# Patient Record
Sex: MALE | Race: BLACK OR AFRICAN AMERICAN | Employment: UNEMPLOYED | URBAN - METROPOLITAN AREA
[De-identification: names, ages, dates, MRNs, and addresses within clinical notes are randomized per-mention and may not be internally consistent; named-entity substitution may affect disease eponyms.]

---

## 2021-04-21 ENCOUNTER — OFFICE VISIT (OUTPATIENT)
Dept: CARDIOLOGY CLINIC | Age: 61
End: 2021-04-21
Payer: MEDICAID

## 2021-04-21 VITALS
WEIGHT: 229.9 LBS | DIASTOLIC BLOOD PRESSURE: 88 MMHG | BODY MASS INDEX: 32.19 KG/M2 | RESPIRATION RATE: 18 BRPM | SYSTOLIC BLOOD PRESSURE: 150 MMHG | OXYGEN SATURATION: 99 % | HEART RATE: 98 BPM | HEIGHT: 71 IN

## 2021-04-21 DIAGNOSIS — I10 ESSENTIAL HYPERTENSION: ICD-10-CM

## 2021-04-21 DIAGNOSIS — Z95.5 S/P DRUG ELUTING CORONARY STENT PLACEMENT: ICD-10-CM

## 2021-04-21 DIAGNOSIS — I48.0 PAF (PAROXYSMAL ATRIAL FIBRILLATION) (HCC): ICD-10-CM

## 2021-04-21 DIAGNOSIS — I25.10 ASHD (ARTERIOSCLEROTIC HEART DISEASE): Primary | ICD-10-CM

## 2021-04-21 DIAGNOSIS — F17.200 CURRENT SMOKER: ICD-10-CM

## 2021-04-21 DIAGNOSIS — I42.0 DILATED CARDIOMYOPATHY (HCC): ICD-10-CM

## 2021-04-21 PROCEDURE — 99204 OFFICE O/P NEW MOD 45 MIN: CPT | Performed by: INTERNAL MEDICINE

## 2021-04-21 PROCEDURE — 93000 ELECTROCARDIOGRAM COMPLETE: CPT | Performed by: INTERNAL MEDICINE

## 2021-04-21 RX ORDER — CARVEDILOL 6.25 MG/1
6.25 TABLET ORAL 2 TIMES DAILY WITH MEALS
COMMUNITY
End: 2021-04-21

## 2021-04-21 RX ORDER — CLOPIDOGREL BISULFATE 75 MG/1
75 TABLET ORAL DAILY
COMMUNITY
End: 2021-04-21

## 2021-04-21 RX ORDER — CARVEDILOL 12.5 MG/1
12.5 TABLET ORAL 2 TIMES DAILY WITH MEALS
Qty: 180 TAB | Refills: 1 | Status: SHIPPED | OUTPATIENT
Start: 2021-04-21 | End: 2021-12-30

## 2021-04-21 NOTE — PROGRESS NOTES
[address] [facility], [address] [facility]  [phone]     Subjective: Slick Lara is a 56 y.o. male is here for  New patient consultation. He has pmhx CAD s/p stent, Dilated Cardiomyopathy, HTN, HLD, current smoker, and esophageal  Spasms. He drinks on weekends only, denies any illegal drug use. Father  from MI age early 46s. Endorses sob at night laying supine, and with activity. Gradually progressing. Mild chest heaviness at times. Also reports fatigue. He presents in AF, unknown duration. Denies hx irregular heart beat. The patient denies orthopnea, PND, LE edema, palpitations, syncope, or presyncope. There are no active problems to display for this patient. None  Past Medical History:   Diagnosis Date    Cardiomyopathy [facility])     Essential hypertension       Past Surgical History:   Procedure Laterality Date    HX CORONARY STENT PLACEMENT       No Known Allergies   History reviewed. No pertinent family history.    Social History     Socioeconomic History    Marital status: UNKNOWN     Spouse name: Not on file    Number of children: Not on file    Years of education: Not on file    Highest education level: Not on file   Occupational History    Not on file   Social Needs    Financial resource strain: Not on file    Food insecurity     Worry: Not on file     Inability: Not on file    Transportation needs     Medical: Not on file     Non-medical: Not on file   Tobacco Use    Smoking status: Light Tobacco Smoker    Smokeless tobacco: Never Used   Substance and Sexual Activity    Alcohol use: Yes     Comment: Socially    Drug use: Never    Sexual activity: Yes     Partners: Female   Lifestyle    Physical activity     Days per week: Not on file     Minutes per session: Not on file    Stress: Not on file   Relationships    Social connections     Talks on phone: Not on file     Gets together: Not on file     Attends Yazidi service: Not on file     Active member of club or organization: Not on file     Attends meetings of clubs or organizations: Not on file     Relationship status: Not on file    Intimate partner violence     Fear of current or ex partner: Not on file     Emotionally abused: Not on file     Physically abused: Not on file     Forced sexual activity: Not on file   Other Topics Concern    Not on file   Social History Narrative    Not on file      Current Outpatient Medications   Medication Sig    ASPIRIN PO Take  by mouth.  apixaban (ELIQUIS) 5 mg tablet Take 1 Tab by mouth two (2) times a day.  carvediloL (Coreg) 12.5 mg tablet Take 1 Tab by mouth two (2) times daily (with meals). No current facility-administered medications for this visit. Review of Symptoms:  11 systems reviewed, negative other than as stated in the HPI    Physical ExamPhysical Exam:    Vitals:    04/21/21 1452   BP: (!) 150/88   Pulse: 98   Resp: 18   SpO2: 99%   Weight: 229 lb 14.4 oz (104.3 kg)   Height: 5' 11\" (1.803 m)     Body mass index is 32.06 kg/m². General PE  Gen:  NAD  Mental Status - Alert. General Appearance - Not in acute distress. HEENT:  PERRL, no carotid bruits or JVD  Chest and Lung Exam   Inspection: Accessory muscles - No use of accessory muscles in breathing. Auscultation:   Breath sounds: - Normal.   Cardiovascular   Inspection: Jugular vein - Bilateral - Inspection Normal.   Palpation/Percussion:   Apical Impulse: - Normal.   Auscultation: Rhythm - IRRegular. Heart Sounds - S1 WNL and S2 WNL. No S3 or S4. Murmurs & Other Heart Sounds: Auscultation of the heart reveals - No Murmurs. Peripheral Vascular   Upper Extremity: Inspection - Bilateral - No Cyanotic nailbeds or Digital clubbing. Lower Extremity:   Palpation: Edema - Bilateral - No edema. Abdomen:   Soft, non-tender, bowel sounds are active.   Neuro: A&O times 3, CN and motor grossly WNL    Labs:   No results found for: CHOL, CHOLX, CHLST, CHOLV, [id number], HDL, HDLP, LDL, LDLC, DLDLP, TGLX, TRIGL, TRIGP, CHHD, CHHDX  No results found for: CPK, CPKX, CPX  No results found for: NA, K, CL, CO2, AGAP, GLU, BUN, CREA, BUCR, GFRAA, GFRNA, CA, TBIL, TBILI, AP, TP, ALB, GLOB, AGRAT, ALT    EKG:  AF       Assessment:     Assessment:      1. ASHD (arteriosclerotic heart disease)    2. Dilated cardiomyopathy ([facility]    3. [facility]    4. Essential hypertension    5. S/P drug eluting coronary stent placement    6. Current smoker        Orders Placed This Encounter    LIPID PANEL     Standing Status:   Future     Standing Expiration Date:   9/55/3162    METABOLIC PANEL, COMPREHENSIVE     Standing Status:   Future     Standing Expiration Date:   4/21/2022    CBC W/O DIFF     Standing Status:   Future     Standing Expiration Date:   4/21/2022    MAGNESIUM     Standing Status:   Future     Standing Expiration Date:   4/21/2022    TSH 3RD GENERATION     Standing Status:   Future     Standing Expiration Date:   4/21/2022    T4 (THYROXINE)     Standing Status:   Future     Standing Expiration Date:   4/21/2022    AMB POC EKG ROUTINE W/ 12 LEADS, INTER & REP     Order Specific Question:   Reason for Exam:     Answer:   Routine    ASPIRIN PO     Sig: Take  by mouth.  DISCONTD: clopidogreL (Plavix) 75 mg tab     Sig: Take 75 mg by mouth daily.  DISCONTD: carvediloL (Coreg) 6.25 mg tablet     Sig: Take 6.25 mg by mouth two (2) times daily (with meals).  apixaban (ELIQUIS) 5 mg tablet     Sig: Take 1 Tab by mouth two (2) times a day. Dispense:  180 Tab     Refill:  1    carvediloL (Coreg) 12.5 mg tablet     Sig: Take 1 Tab by mouth two (2) times daily (with meals).      Dispense:  180 Tab     Refill:  1        Plan:     Atrial Fibrillation  Unknown duration  Check echo  Increase Carvedilol 12.5 mg BID for rate control  Atrial Fibrillation CHADSVASC2 Score Stroke Risk:   56 y.o. <65        + 0    male Male     [de-identified]   CHF HX: Yes    +1   HTN HX: Yes    +1   Stroke/TIA/Thromboembolism No    +0   Vascular Disease HX: Yes    +1   Diabetes Mellitus No    + 0   CHADSVASC 2 Score 3      Annual Stroke Risk 3.2% - moderate-high        Start eliquis 5 mg BID  Discussed risks and benefits of anticoagulation, signs and symptoms of bleeding, and to call if any concerns. Follow up in 4 weeks. Consider setting up cardioversion plus or minus NBA at that time. CAD   Hx PCI/TOMASZ to RCA in 12/18  On ASA, increasing BB  Checking stress test cardiolyte for c/o watkins  Checking labs and will start statin then       Dilated  Cardiomyopathy  On BB  Obtain echo. If EF remains low and stable kidney fxn, will start entresto  Recall: prev on losartan    HLD  Check labs now. Will start statin once labs are back  Recall: prev on atorva    Current smoker; down to less than half a pack per day  Tobacco cessation provided      Follow-up to be determined based on test results. Bart Gibson NP    Patient seen and examined by me with the above nurse practitioner. I personally performed all components of the history, physical, and medical decision making and agree with the assessment and plan with minor modifications as noted. Today the patient presents with new onset of AF, and overdue for follow-up with history of CAD and suspected hypertensive cardiomyopathy, ejection fraction 30 to 35%. General PE  Gen:  NAD  Mental Status - Alert. General Appearance - Not in acute distress. HEENT:  PERRL, no carotid bruits or JVD  Chest and Lung Exam   Inspection: Accessory muscles - No use of accessory muscles in breathing. Auscultation:   Breath sounds: - Normal.   Cardiovascular   Inspection: Jugular vein - Bilateral - Inspection Normal.   Palpation/Percussion:   Apical Impulse: - Normal.   Auscultation: Rhythm -irregular, slightly tachycardic. Heart Sounds - S1 WNL and S2 WNL. No S3 or S4. Murmurs & Other Heart Sounds:  Auscultation of the heart reveals - No Murmurs. Peripheral Vascular   Upper Extremity: Inspection - Bilateral - No Cyanotic nailbeds or Digital clubbing. Lower Extremity:   Palpation: Edema - Bilateral - No edema. Abdomen:   Soft, non-tender, bowel sounds are active. Neuro: A&O times 3, CN and motor grossly WNL    New onset AF. Start Eliquis. Get echo and walking stress Myoview for HILL. Check labs. Reassess patient in 1 month and consider cardioversion plus or minus NBA at that time. Discussed risks and benefits of anticoagulation, signs and symptoms of bleeding, and to call if any concerns. He does admit he drinks alcohol on weekends and I advised on the relationship between AF and alcohol.

## 2021-04-21 NOTE — PROGRESS NOTES
1. Have you been to the ER, urgent care clinic since your last visit? Hospitalized since your last visit? No    2. Have you seen or consulted any other health care providers outside of the [address] [place] [address] since your last visit? Include any pap smears or colon screening.  No

## 2021-05-07 ENCOUNTER — APPOINTMENT (OUTPATIENT)
Dept: GENERAL RADIOLOGY | Age: 61
DRG: 192 | End: 2021-05-07
Attending: EMERGENCY MEDICINE
Payer: MEDICAID

## 2021-05-07 ENCOUNTER — HOSPITAL ENCOUNTER (INPATIENT)
Age: 61
LOS: 4 days | Discharge: HOME | DRG: 192 | End: 2021-05-11
Attending: EMERGENCY MEDICINE | Admitting: HOSPITALIST
Payer: MEDICAID

## 2021-05-07 DIAGNOSIS — R07.9 CHEST PAIN, UNSPECIFIED TYPE: ICD-10-CM

## 2021-05-07 DIAGNOSIS — I16.1 HYPERTENSIVE EMERGENCY: Primary | ICD-10-CM

## 2021-05-07 DIAGNOSIS — I50.9 CONGESTIVE HEART FAILURE, UNSPECIFIED HF CHRONICITY, UNSPECIFIED HEART FAILURE TYPE (HCC): ICD-10-CM

## 2021-05-07 DIAGNOSIS — Z86.79 HISTORY OF ATRIAL FIBRILLATION: ICD-10-CM

## 2021-05-07 DIAGNOSIS — R77.8 ELEVATED TROPONIN: ICD-10-CM

## 2021-05-07 PROBLEM — I10 HTN (HYPERTENSION), MALIGNANT: Status: ACTIVE | Noted: 2021-05-07

## 2021-05-07 LAB
ALBUMIN SERPL-MCNC: 3.7 G/DL (ref 3.5–5)
ALBUMIN/GLOB SERPL: 0.9 {RATIO} (ref 1.1–2.2)
ALP SERPL-CCNC: 108 U/L (ref 45–117)
ALT SERPL-CCNC: 58 U/L (ref 12–78)
ANION GAP SERPL CALC-SCNC: 8 MMOL/L (ref 5–15)
AST SERPL W P-5'-P-CCNC: 68 U/L (ref 15–37)
ATRIAL RATE: 101 BPM
BASOPHILS # BLD: 0.1 K/UL (ref 0–0.1)
BASOPHILS NFR BLD: 1 % (ref 0–1)
BILIRUB SERPL-MCNC: 0.8 MG/DL (ref 0.2–1)
BNP SERPL-MCNC: 2197 PG/ML
BUN SERPL-MCNC: 11 MG/DL (ref 6–20)
BUN/CREAT SERPL: 11 (ref 12–20)
CA-I BLD-MCNC: 9 MG/DL (ref 8.5–10.1)
CALCULATED P AXIS, ECG09: 84 DEGREES
CALCULATED R AXIS, ECG10: 35 DEGREES
CALCULATED T AXIS, ECG11: 109 DEGREES
CHLORIDE SERPL-SCNC: 104 MMOL/L (ref 97–108)
CO2 SERPL-SCNC: 26 MMOL/L (ref 21–32)
CREAT SERPL-MCNC: 0.96 MG/DL (ref 0.7–1.3)
DIAGNOSIS, 93000: NORMAL
DIFFERENTIAL METHOD BLD: ABNORMAL
EOSINOPHIL # BLD: 0.1 K/UL (ref 0–0.4)
EOSINOPHIL NFR BLD: 1 % (ref 0–7)
ERYTHROCYTE [DISTWIDTH] IN BLOOD BY AUTOMATED COUNT: 18.5 % (ref 11.5–14.5)
GLOBULIN SER CALC-MCNC: 4.2 G/DL (ref 2–4)
GLUCOSE SERPL-MCNC: 107 MG/DL (ref 65–100)
HCT VFR BLD AUTO: 49.3 % (ref 36.6–50.3)
HGB BLD-MCNC: 15.7 G/DL (ref 12.1–17)
IMM GRANULOCYTES # BLD AUTO: 0 K/UL (ref 0–0.04)
IMM GRANULOCYTES NFR BLD AUTO: 0 % (ref 0–0.5)
INR PPP: 1.1 (ref 0.9–1.1)
LYMPHOCYTES # BLD: 2.3 K/UL (ref 0.8–3.5)
LYMPHOCYTES NFR BLD: 25 % (ref 12–49)
MAGNESIUM SERPL-MCNC: 1.8 MG/DL (ref 1.6–2.4)
MCH RBC QN AUTO: 23.2 PG (ref 26–34)
MCHC RBC AUTO-ENTMCNC: 31.8 G/DL (ref 30–36.5)
MCV RBC AUTO: 72.9 FL (ref 80–99)
MONOCYTES # BLD: 0.7 K/UL (ref 0–1)
MONOCYTES NFR BLD: 7 % (ref 5–13)
NEUTS SEG # BLD: 6.2 K/UL (ref 1.8–8)
NEUTS SEG NFR BLD: 66 % (ref 32–75)
NRBC # BLD: 0 K/UL (ref 0–0.01)
NRBC BLD-RTO: 0 PER 100 WBC
P-R INTERVAL, ECG05: 214 MS
PLATELET # BLD AUTO: 340 K/UL (ref 150–400)
PMV BLD AUTO: 11.8 FL (ref 8.9–12.9)
POTASSIUM SERPL-SCNC: 3.5 MMOL/L (ref 3.5–5.1)
PROT SERPL-MCNC: 7.9 G/DL (ref 6.4–8.2)
PROTHROMBIN TIME: 13.9 SEC (ref 11.9–14.7)
Q-T INTERVAL, ECG07: 324 MS
QRS DURATION, ECG06: 86 MS
QTC CALCULATION (BEZET), ECG08: 420 MS
RBC # BLD AUTO: 6.76 M/UL (ref 4.1–5.7)
SODIUM SERPL-SCNC: 138 MMOL/L (ref 136–145)
TROPONIN I SERPL-MCNC: 0.1 NG/ML
TROPONIN I SERPL-MCNC: 0.11 NG/ML
TROPONIN I SERPL-MCNC: 0.12 NG/ML
VENTRICULAR RATE, ECG03: 101 BPM
WBC # BLD AUTO: 9.4 K/UL (ref 4.1–11.1)

## 2021-05-07 PROCEDURE — 74011000250 HC RX REV CODE- 250: Performed by: EMERGENCY MEDICINE

## 2021-05-07 PROCEDURE — 74011250637 HC RX REV CODE- 250/637: Performed by: EMERGENCY MEDICINE

## 2021-05-07 PROCEDURE — 36415 COLL VENOUS BLD VENIPUNCTURE: CPT

## 2021-05-07 PROCEDURE — 74011250636 HC RX REV CODE- 250/636: Performed by: EMERGENCY MEDICINE

## 2021-05-07 PROCEDURE — 85610 PROTHROMBIN TIME: CPT

## 2021-05-07 PROCEDURE — 84484 ASSAY OF TROPONIN QUANT: CPT

## 2021-05-07 PROCEDURE — 71045 X-RAY EXAM CHEST 1 VIEW: CPT

## 2021-05-07 PROCEDURE — 99284 EMERGENCY DEPT VISIT MOD MDM: CPT

## 2021-05-07 PROCEDURE — 80053 COMPREHEN METABOLIC PANEL: CPT

## 2021-05-07 PROCEDURE — 83735 ASSAY OF MAGNESIUM: CPT

## 2021-05-07 PROCEDURE — 85025 COMPLETE CBC W/AUTO DIFF WBC: CPT

## 2021-05-07 PROCEDURE — 96374 THER/PROPH/DIAG INJ IV PUSH: CPT

## 2021-05-07 PROCEDURE — 65270000029 HC RM PRIVATE

## 2021-05-07 PROCEDURE — 74011250637 HC RX REV CODE- 250/637: Performed by: HOSPITALIST

## 2021-05-07 PROCEDURE — 96375 TX/PRO/DX INJ NEW DRUG ADDON: CPT

## 2021-05-07 PROCEDURE — 83880 ASSAY OF NATRIURETIC PEPTIDE: CPT

## 2021-05-07 PROCEDURE — 93005 ELECTROCARDIOGRAM TRACING: CPT

## 2021-05-07 PROCEDURE — 74011250636 HC RX REV CODE- 250/636: Performed by: INTERNAL MEDICINE

## 2021-05-07 RX ORDER — LABETALOL HYDROCHLORIDE 5 MG/ML
20 INJECTION, SOLUTION INTRAVENOUS ONCE
Status: COMPLETED | OUTPATIENT
Start: 2021-05-07 | End: 2021-05-07

## 2021-05-07 RX ORDER — FUROSEMIDE 10 MG/ML
80 INJECTION INTRAMUSCULAR; INTRAVENOUS ONCE
Status: COMPLETED | OUTPATIENT
Start: 2021-05-07 | End: 2021-05-07

## 2021-05-07 RX ORDER — GUAIFENESIN 100 MG/5ML
81 LIQUID (ML) ORAL DAILY
Status: DISCONTINUED | OUTPATIENT
Start: 2021-05-08 | End: 2021-05-07

## 2021-05-07 RX ORDER — LISINOPRIL 5 MG/1
10 TABLET ORAL DAILY
Status: DISCONTINUED | OUTPATIENT
Start: 2021-05-07 | End: 2021-05-11 | Stop reason: HOSPADM

## 2021-05-07 RX ORDER — FUROSEMIDE 10 MG/ML
40 INJECTION INTRAMUSCULAR; INTRAVENOUS DAILY
Status: DISCONTINUED | OUTPATIENT
Start: 2021-05-07 | End: 2021-05-11

## 2021-05-07 RX ORDER — DILTIAZEM HCL/D5W 125 MG/125
5 PLASTIC BAG, INJECTION (ML) INTRAVENOUS
Status: DISCONTINUED | OUTPATIENT
Start: 2021-05-07 | End: 2021-05-08

## 2021-05-07 RX ORDER — HEPARIN SODIUM 1000 [USP'U]/ML
2000 INJECTION, SOLUTION INTRAVENOUS; SUBCUTANEOUS AS NEEDED
Status: DISCONTINUED | OUTPATIENT
Start: 2021-05-07 | End: 2021-05-11 | Stop reason: HOSPADM

## 2021-05-07 RX ORDER — LABETALOL HCL 20 MG/4 ML
10 SYRINGE (ML) INTRAVENOUS
Status: DISCONTINUED | OUTPATIENT
Start: 2021-05-07 | End: 2021-05-11 | Stop reason: HOSPADM

## 2021-05-07 RX ORDER — HEPARIN SODIUM 1000 [USP'U]/ML
4000 INJECTION, SOLUTION INTRAVENOUS; SUBCUTANEOUS AS NEEDED
Status: DISCONTINUED | OUTPATIENT
Start: 2021-05-07 | End: 2021-05-11 | Stop reason: HOSPADM

## 2021-05-07 RX ORDER — ASPIRIN 325 MG
325 TABLET ORAL
Status: COMPLETED | OUTPATIENT
Start: 2021-05-07 | End: 2021-05-07

## 2021-05-07 RX ORDER — HEPARIN SODIUM 10000 [USP'U]/100ML
12-25 INJECTION, SOLUTION INTRAVENOUS CONTINUOUS
Status: DISCONTINUED | OUTPATIENT
Start: 2021-05-07 | End: 2021-05-07

## 2021-05-07 RX ORDER — DILTIAZEM HYDROCHLORIDE 5 MG/ML
20 INJECTION INTRAVENOUS
Status: DISPENSED | OUTPATIENT
Start: 2021-05-07 | End: 2021-05-07

## 2021-05-07 RX ORDER — CARVEDILOL 12.5 MG/1
12.5 TABLET ORAL 2 TIMES DAILY WITH MEALS
Status: DISCONTINUED | OUTPATIENT
Start: 2021-05-07 | End: 2021-05-11 | Stop reason: HOSPADM

## 2021-05-07 RX ORDER — HEPARIN SODIUM 10000 [USP'U]/100ML
12-25 INJECTION, SOLUTION INTRAVENOUS CONTINUOUS
Status: DISCONTINUED | OUTPATIENT
Start: 2021-05-07 | End: 2021-05-10

## 2021-05-07 RX ADMIN — Medication 5 MG/HR: at 18:52

## 2021-05-07 RX ADMIN — CARVEDILOL 12.5 MG: 12.5 TABLET, FILM COATED ORAL at 08:29

## 2021-05-07 RX ADMIN — FUROSEMIDE 80 MG: 10 INJECTION, SOLUTION INTRAMUSCULAR; INTRAVENOUS at 07:22

## 2021-05-07 RX ADMIN — APIXABAN 5 MG: 5 TABLET, FILM COATED ORAL at 08:30

## 2021-05-07 RX ADMIN — LABETALOL HYDROCHLORIDE 20 MG: 5 INJECTION INTRAVENOUS at 07:23

## 2021-05-07 RX ADMIN — LISINOPRIL 10 MG: 10 TABLET ORAL at 10:45

## 2021-05-07 RX ADMIN — NITROGLYCERIN 1 INCH: 20 OINTMENT TOPICAL at 07:24

## 2021-05-07 RX ADMIN — ASPIRIN 325 MG ORAL TABLET 325 MG: 325 PILL ORAL at 08:29

## 2021-05-07 NOTE — CONSULTS
Cardiology Consult    NAME: Uli Alcantara   :  1960   MRN:  [id number]     Date/Time:  2021 3:12 PM    Patient PCP: Maria A Menendez NP      Subjective:   CHIEF COMPLAINT: SOB      REASON FOR CONSULT: Atrial fibrillation      HISTORY OF PRESENT ILLNESS:     Uli Alcantara is a 56 y.o. BLACK/ male who has a HTN, CAD, s/p previous MI with PCI in 2018 at [facility]. He has persistent atrial fibrillation and has been started on Eliquis and has a follow-up appointment with his cardiologist in the next couple of weeks. He came to the emergency room because he was having increasing shortness of breath, dyspnea exertion, and palpitations that woke him up at 2am today. He was found to be in rapid A. fib and to have acute CHF decompensation. He denies any history of increased sodium intake. He says he has been compliant with his prescribed medications        Past Medical History:   Diagnosis Date    Cardiomyopathy [facility])     Essential hypertension       Past Surgical History:   Procedure Laterality Date    HX CORONARY STENT PLACEMENT       No Known Allergies   Meds:  See below  Social History     Tobacco Use    Smoking status: Light Tobacco Smoker    Smokeless tobacco: Never Used   Substance Use Topics    Alcohol use: Yes     Comment: Socially      No family history on file.     REVIEW OF SYSTEMS:     []         Unable to obtain  ROS due to ---   [x]         Total of 12 systems reviewed as follows:    Constitutional: negative fever, negative chills,  Eyes:   negative visual changes  ENT:   negative sore throat, tongue or lip swelling  Respiratory:  negative cough, negative dyspnea  Cards:  negative for chest pain, palpitations  GI:   negative for nausea, vomiting  Genitourinary: negative for frequency, dysuria  Integument:  negative for rash   Hematologic:  negative for easy bruising and gum/nose bleeding  Musculoskel: negative for myalgias, back pain  Neurological:  negative for headaches, dizziness, weakness      Objective:      Physical Exam: Patient breathing comfortably, alert and watching television    Last 24hrs VS reviewed since prior progress note. Most recent are:    Visit Vitals  BP (!) 158/90 (BP 1 Location: Left upper arm, BP Patient Position: At rest)   Pulse 80   Temp 98.2 °F (36.8 °C)   Resp 21   Ht 5' 11\" (1.803 m)   Wt 103.9 kg (229 lb)   SpO2 98%   BMI 31.94 kg/m²       Intake/Output Summary (Last 24 hours) at 5/7/2021 1512  Last data filed at 5/7/2021 1049  Gross per 24 hour   Intake --   Output 1225 ml   Net -1225 ml        General Appearance: Well developed, alert, no acute distress. Ears/Nose/Mouth/Throat: Pupils equal and round, Hearing grossly normal.  Neck: Supple. No JVP. Good carotids upstrokes, no bruit. Chest: Lungs clear to auscultation bilaterally. Cardiovascular: Irregularly irregular rhythm, S1S2 normal, soft systolic murmur  Abdomen: Soft, non-tender, bowel sounds are active. Extremities: No edema bilaterally. Femoral pulses +2, Distal Pulses +1. Skin: Warm and dry. Neuro: Alert and oriented x 3, normal speech; follows simple commands  Psychiatric: Cooperative, normal affect and judgment      Data:      Telemetry: Atrial fibrillation with heart rate in the 90s    EKG: Atrial fibrillation with LVH by voltage criteria    []  No new EKG for review. Prior to Admission medications    Medication Sig Start Date End Date Taking? Authorizing Provider   ASPIRIN PO Take  by mouth. Provider, Historical   apixaban (ELIQUIS) 5 mg tablet Take 1 Tab by mouth two (2) times a day. 4/21/21   Daksha Boyle NP   carvediloL (Coreg) 12.5 mg tablet Take 1 Tab by mouth two (2) times daily (with meals).  4/21/21   Daksha Boyle NP       Recent Results (from the past 24 hour(s))   CBC WITH AUTOMATED DIFF    Collection Time: 05/07/21  6:45 AM   Result Value Ref Range    WBC 9.4 4.1 - 11.1 K/uL    RBC 6.76 (H) 4.10 - 5.70 M/uL HGB 15.7 12.1 - 17.0 g/dL    HCT 49.3 36.6 - 50.3 %    MCV 72.9 (L) 80.0 - 99.0 FL    MCH 23.2 (L) 26.0 - 34.0 PG    MCHC 31.8 30.0 - 36.5 g/dL    RDW 18.5 (H) 11.5 - 14.5 %    PLATELET 715 780 - 085 K/uL    MPV 11.8 8.9 - 12.9 FL    NRBC 0.0 0.0  WBC    ABSOLUTE NRBC 0.00 0.00 - 0.01 K/uL    NEUTROPHILS 66 32 - 75 %    LYMPHOCYTES 25 12 - 49 %    MONOCYTES 7 5 - 13 %    EOSINOPHILS 1 0 - 7 %    BASOPHILS 1 0 - 1 %    IMMATURE GRANULOCYTES 0 0 - 0.5 %    ABS. NEUTROPHILS 6.2 1.8 - 8.0 K/UL    ABS. LYMPHOCYTES 2.3 0.8 - 3.5 K/UL    ABS. MONOCYTES 0.7 0.0 - 1.0 K/UL    ABS. EOSINOPHILS 0.1 0.0 - 0.4 K/UL    ABS. BASOPHILS 0.1 0.0 - 0.1 K/UL    ABS. IMM. GRANS. 0.0 0.00 - 0.04 K/UL    DF AUTOMATED     PROTHROMBIN TIME + INR    Collection Time: 05/07/21  6:45 AM   Result Value Ref Range    Prothrombin time 13.9 11.9 - 14.7 sec    INR 1.1 0.9 - 1.1     METABOLIC PANEL, COMPREHENSIVE    Collection Time: 05/07/21  6:45 AM   Result Value Ref Range    Sodium 138 136 - 145 mmol/L    Potassium 3.5 3.5 - 5.1 mmol/L    Chloride 104 97 - 108 mmol/L    CO2 26 21 - 32 mmol/L    Anion gap 8 5 - 15 mmol/L    Glucose 107 (H) 65 - 100 mg/dL    BUN 11 6 - 20 mg/dL    Creatinine 0.96 0.70 - 1.30 mg/dL    BUN/Creatinine ratio 11 (L) 12 - 20      GFR est AA >60 >60 ml/min/1.73m2    GFR est non-AA >60 >60 ml/min/1.73m2    Calcium 9.0 8.5 - 10.1 mg/dL    Bilirubin, total 0.8 0.2 - 1.0 mg/dL    AST (SGOT) 68 (H) 15 - 37 U/L    ALT (SGPT) 58 12 - 78 U/L    Alk.  phosphatase 108 45 - 117 U/L    Protein, total 7.9 6.4 - 8.2 g/dL    Albumin 3.7 3.5 - 5.0 g/dL    Globulin 4.2 (H) 2.0 - 4.0 g/dL    A-G Ratio 0.9 (L) 1.1 - 2.2     TROPONIN I    Collection Time: 05/07/21  6:45 AM   Result Value Ref Range    Troponin-I, Qt. 0.12 (H) <0.05 ng/mL   MAGNESIUM    Collection Time: 05/07/21  6:45 AM   Result Value Ref Range    Magnesium 1.8 1.6 - 2.4 mg/dL   BNP    Collection Time: 05/07/21  6:45 AM   Result Value Ref Range    NT pro-BNP 2,197 (H) <125 pg/mL   TROPONIN I    Collection Time: 05/07/21 10:06 AM   Result Value Ref Range    Troponin-I, Qt. 0.11 (H) <0.05 ng/mL   TROPONIN I    Collection Time: 05/07/21  1:00 PM   Result Value Ref Range    Troponin-I, Qt. 0.10 (H) <0.05 ng/mL        _______________________________________________________________________     Assessment:   Atrial fibrillation with rapid ventricular response  Acute CHF decompensation  Troponin elevation, intermediate  Ischemic cardiomyopathy  Tobacco addiction  CAD s/p MI w coronary stents 2018      Plan:   Hold Eliquis and start on IV heparin in case patient needs invasive cardiac evaluation  Continue to trend cardiac enzymes  Beta-blockers for heart rate control  Complete echocardiogram to assess EF and filling pressure  Encourage smoking cessation      []        High complexity decision making was performed    Emil Gowers, MD

## 2021-05-07 NOTE — PROGRESS NOTES
BEDSIDE VERBAL shift change report given to Cinthya Goode and Brianna Slade (oncoming nurse) by Isaiah Bernheim, LPN (offgoing nurse).  Report included SBAR

## 2021-05-07 NOTE — ROUTINE PROCESS
TRANSFER - OUT REPORT:    Verbal report given to AMI Agarwal(name) on Janifer Cea  being transferred to (unit) for routine progression of care       Report consisted of patients Situation, Background, Assessment and   Recommendations(SBAR). Information from the following report(s) SBAR, Kardex, Intake/Output, MAR and Recent Results was reviewed with the receiving nurse. Lines:   Peripheral IV 05/07/21 Right Antecubital (Active)   Site Assessment Clean, dry, & intact 05/07/21 0710   Phlebitis Assessment 0 05/07/21 0710   Infiltration Assessment 0 05/07/21 0710   Dressing Status Clean, dry, & intact 05/07/21 0710   Dressing Type Transparent;Tape 05/07/21 0710   Hub Color/Line Status Pink;Flushed 05/07/21 0710        Opportunity for questions and clarification was provided.       Patient transported with:   Tech, telemetry, pt belongings and chart

## 2021-05-07 NOTE — Clinical Note
TRANSFER - OUT REPORT:     Verbal report given to: Jaime Solomon RN. Report consisted of patient's Situation, Background, Assessment and   Recommendations(SBAR). Opportunity for questions and clarification was provided. Patient transported with a Registered Nurse. Patient transported to: PACU.

## 2021-05-07 NOTE — H&P
History and Physical    Subjective:   Chief Complaint : shortness of breath since last night  Source of information : patient     History of present illness:     60M, h/o CAD s/p stent, CHF unknwon EF, pAFIB on eliquis with shortness of breath since last night    He states, last night he woke up at 2AM gasping for air, started breathing through the mouth. This was sudden, and would worsen when he would lie down. + cough + orthopnea. No lower extremity swelling. ER: lasix 80. HTN urgency- labetalol    Denies fever, sputum production,       Past Medical History:   Diagnosis Date    Cardiomyopathy [facility])     Essential hypertension      Past Surgical History:   Procedure Laterality Date    HX CORONARY STENT PLACEMENT  2019     No family history on file. Social History     Tobacco Use    Smoking status: Light Tobacco Smoker    Smokeless tobacco: Never Used   Substance Use Topics    Alcohol use: Yes     Comment: Socially       Prior to Admission medications    Medication Sig Start Date End Date Taking? Authorizing Provider   ASPIRIN PO Take  by mouth. Provider, Historical   apixaban (ELIQUIS) 5 mg tablet Take 1 Tab by mouth two (2) times a day. 4/21/21   Marvel Kunz., NP   carvediloL (Coreg) 12.5 mg tablet Take 1 Tab by mouth two (2) times daily (with meals). 4/21/21   Marvel Peres, NP     No Known Allergies          Review of Systems:  Review of Systems   Constitutional: Negative. HENT: Negative for congestion, nosebleeds, sinus pressure and sinus pain. Eyes: Negative. Negative for photophobia. Respiratory: Positive for chest tightness and shortness of breath. Negative for apnea, cough, choking and stridor. Cardiovascular: Positive for palpitations and leg swelling. Gastrointestinal: Negative. Genitourinary: Negative. Musculoskeletal: Negative. Negative for back pain and gait problem. Skin: Negative. Allergic/Immunologic: Negative. Neurological: Negative. Negative for weakness, light-headedness and numbness. Hematological: Negative. Psychiatric/Behavioral: Negative. Vitals:     Visit Vitals  BP (!) 171/118   Pulse 92   Temp 97.9 °F (36.6 °C)   Resp 20   Ht 5' 11\" (1.803 m)   Wt 103.9 kg (229 lb)   SpO2 98%   BMI 31.94 kg/m²       Physical Exam:   Physical Exam  Vitals signs and nursing note reviewed. Constitutional:       General: He is in acute distress. Appearance: Normal appearance. He is ill-appearing. HENT:      Head: Normocephalic and atraumatic. Mouth/Throat:      Pharynx: Oropharynx is clear. Eyes:      Extraocular Movements: Extraocular movements intact. Pupils: Pupils are equal, round, and reactive to light. Neck:      Musculoskeletal: Normal range of motion and neck supple. Cardiovascular:      Rate and Rhythm: Normal rate and regular rhythm. Pulses: Normal pulses. Heart sounds: Normal heart sounds. Comments: Chest wall tenderness mild  Pulmonary:      Effort: Respiratory distress present. Breath sounds: Normal breath sounds. Abdominal:      General: Abdomen is flat. Bowel sounds are normal.      Palpations: Abdomen is soft. Musculoskeletal: Normal range of motion. Skin:     General: Skin is warm and dry. Neurological:      General: No focal deficit present. Mental Status: He is alert and oriented to person, place, and time. Psychiatric:         Mood and Affect: Mood is anxious.          Behavior: Behavior normal.          Data Review:   Recent Results (from the past 24 hour(s))   CBC WITH AUTOMATED DIFF    Collection Time: 05/07/21  6:45 AM   Result Value Ref Range    WBC 9.4 4.1 - 11.1 K/uL    RBC 6.76 (H) 4.10 - 5.70 M/uL    HGB 15.7 12.1 - 17.0 g/dL    HCT 49.3 36.6 - 50.3 %    MCV 72.9 (L) 80.0 - 99.0 FL    MCH 23.2 (L) 26.0 - 34.0 PG    MCHC 31.8 30.0 - 36.5 g/dL    RDW 18.5 (H) 11.5 - 14.5 %    PLATELET 300 689 - 526 K/uL    MPV 11.8 8.9 - 12.9 FL    NRBC 0.0 0.0  WBC ABSOLUTE NRBC 0.00 0.00 - 0.01 K/uL    NEUTROPHILS 66 32 - 75 %    LYMPHOCYTES 25 12 - 49 %    MONOCYTES 7 5 - 13 %    EOSINOPHILS 1 0 - 7 %    BASOPHILS 1 0 - 1 %    IMMATURE GRANULOCYTES 0 0 - 0.5 %    ABS. NEUTROPHILS 6.2 1.8 - 8.0 K/UL    ABS. LYMPHOCYTES 2.3 0.8 - 3.5 K/UL    ABS. MONOCYTES 0.7 0.0 - 1.0 K/UL    ABS. EOSINOPHILS 0.1 0.0 - 0.4 K/UL    ABS. BASOPHILS 0.1 0.0 - 0.1 K/UL    ABS. IMM. GRANS. 0.0 0.00 - 0.04 K/UL    DF AUTOMATED     PROTHROMBIN TIME + INR    Collection Time: 05/07/21  6:45 AM   Result Value Ref Range    Prothrombin time 13.9 11.9 - 14.7 sec    INR 1.1 0.9 - 1.1     METABOLIC PANEL, COMPREHENSIVE    Collection Time: 05/07/21  6:45 AM   Result Value Ref Range    Sodium 138 136 - 145 mmol/L    Potassium 3.5 3.5 - 5.1 mmol/L    Chloride 104 97 - 108 mmol/L    CO2 26 21 - 32 mmol/L    Anion gap 8 5 - 15 mmol/L    Glucose 107 (H) 65 - 100 mg/dL    BUN 11 6 - 20 mg/dL    Creatinine 0.96 0.70 - 1.30 mg/dL    BUN/Creatinine ratio 11 (L) 12 - 20      GFR est AA >60 >60 ml/min/1.73m2    GFR est non-AA >60 >60 ml/min/1.73m2    Calcium 9.0 8.5 - 10.1 mg/dL    Bilirubin, total 0.8 0.2 - 1.0 mg/dL    AST (SGOT) 68 (H) 15 - 37 U/L    ALT (SGPT) 58 12 - 78 U/L    Alk. phosphatase 108 45 - 117 U/L    Protein, total 7.9 6.4 - 8.2 g/dL    Albumin 3.7 3.5 - 5.0 g/dL    Globulin 4.2 (H) 2.0 - 4.0 g/dL    A-G Ratio 0.9 (L) 1.1 - 2.2     TROPONIN I    Collection Time: 05/07/21  6:45 AM   Result Value Ref Range    Troponin-I, Qt. 0.12 (H) <0.05 ng/mL   MAGNESIUM    Collection Time: 05/07/21  6:45 AM   Result Value Ref Range    Magnesium 1.8 1.6 - 2.4 mg/dL   BNP    Collection Time: 05/07/21  6:45 AM   Result Value Ref Range    NT pro-BNP 2,197 (H) <125 pg/mL             Assessment and Plan :     (1) AECHF unknwon EF: lasix 40 daily, ECCHO    (2) HTN urgency: PRN labetalol. Resume cardvedilol. Add lisinopril.  to 171. resonable drop    (3) CAD s/p stent: no chest pain. Troponin 0.11. start statin.  Order lipid panel. ECHO. Cardiology consult.  Repeat troponin    (4) pAFIB: continue eliquis and carvedilol    DVT ppx: eliquis    DISPO: home    Signed By: Maynor Padron MD     May 7, 2021

## 2021-05-07 NOTE — ED PROVIDER NOTES
EMERGENCY DEPARTMENT HISTORY AND PHYSICAL EXAM      Date: 5/7/2021  Patient Name: Souleymane Day    History of Presenting Illness     Chief Complaint   Patient presents with    Respiratory Distress       History Provided By: Patient    HPI: Souleymane Day, 56 y.o. male presents to the ED with cc of   Chief Complaint   Patient presents with    Respiratory Distress   Onset about 2 am of sob. Denies cough, fever, chest pain. Shortness of breath started at 2 AM patient with history of atrial fibrillation recently started on Eliquis, patient denies any chest pain but does still have some dyspnea and chest tightness, denies any fever denies any exposure to COVID-19   Moderate severity, no known exacerbating or relieving factors, no other associated signs and symptoms. There are no other complaints, changes, or physical findings at this time. PCP: Heladio Foster NP    No current facility-administered medications on file prior to encounter. Current Outpatient Medications on File Prior to Encounter   Medication Sig Dispense Refill    ASPIRIN PO Take  by mouth.  apixaban (ELIQUIS) 5 mg tablet Take 1 Tab by mouth two (2) times a day. 180 Tab 1    carvediloL (Coreg) 12.5 mg tablet Take 1 Tab by mouth two (2) times daily (with meals). 180 Tab 1       Past History     Past Medical History:  Past Medical History:   Diagnosis Date    Cardiomyopathy [facility])     Essential hypertension        Past Surgical History:  Past Surgical History:   Procedure Laterality Date    HX CORONARY STENT PLACEMENT  2019       Family History:  No family history on file. Social History:  Social History     Tobacco Use    Smoking status: Light Tobacco Smoker    Smokeless tobacco: Never Used   Substance Use Topics    Alcohol use: Yes     Comment: Socially    Drug use: Never       Allergies:  No Known Allergies      Review of Systems   Review of Systems   Constitutional: Negative.     HENT: Negative for congestion, nosebleeds, sinus pressure and sinus pain. Eyes: Negative. Negative for photophobia. Respiratory: Positive for chest tightness and shortness of breath. Negative for apnea, cough, choking and stridor. Cardiovascular: Positive for palpitations and leg swelling. Gastrointestinal: Negative. Genitourinary: Negative. Musculoskeletal: Negative. Negative for back pain and gait problem. Skin: Negative. Allergic/Immunologic: Negative. Neurological: Negative. Negative for weakness, light-headedness and numbness. Hematological: Negative. Psychiatric/Behavioral: Negative. Physical Exam   Physical Exam  Vitals signs and nursing note reviewed. Constitutional:       General: He is in acute distress. Appearance: Normal appearance. He is ill-appearing. HENT:      Head: Normocephalic and atraumatic. Mouth/Throat:      Pharynx: Oropharynx is clear. Eyes:      Extraocular Movements: Extraocular movements intact. Pupils: Pupils are equal, round, and reactive to light. Neck:      Musculoskeletal: Normal range of motion and neck supple. Cardiovascular:      Rate and Rhythm: Normal rate and regular rhythm. Pulses: Normal pulses. Heart sounds: Normal heart sounds. Comments: Chest wall tenderness mild  Pulmonary:      Effort: Respiratory distress present. Breath sounds: Normal breath sounds. Abdominal:      General: Abdomen is flat. Bowel sounds are normal.      Palpations: Abdomen is soft. Musculoskeletal: Normal range of motion. Skin:     General: Skin is warm and dry. Neurological:      General: No focal deficit present. Mental Status: He is alert and oriented to person, place, and time. Psychiatric:         Mood and Affect: Mood is anxious.          Behavior: Behavior normal.         Diagnostic Study Results     Labs -     Recent Results (from the past 12 hour(s))   CBC WITH AUTOMATED DIFF    Collection Time: 05/07/21  6:45 AM Result Value Ref Range    WBC 9.4 4.1 - 11.1 K/uL    RBC 6.76 (H) 4.10 - 5.70 M/uL    HGB 15.7 12.1 - 17.0 g/dL    HCT 49.3 36.6 - 50.3 %    MCV 72.9 (L) 80.0 - 99.0 FL    MCH 23.2 (L) 26.0 - 34.0 PG    MCHC 31.8 30.0 - 36.5 g/dL    RDW 18.5 (H) 11.5 - 14.5 %    PLATELET 617 428 - 821 K/uL    MPV 11.8 8.9 - 12.9 FL    NRBC 0.0 0.0  WBC    ABSOLUTE NRBC 0.00 0.00 - 0.01 K/uL    NEUTROPHILS 66 32 - 75 %    LYMPHOCYTES 25 12 - 49 %    MONOCYTES 7 5 - 13 %    EOSINOPHILS 1 0 - 7 %    BASOPHILS 1 0 - 1 %    IMMATURE GRANULOCYTES 0 0 - 0.5 %    ABS. NEUTROPHILS 6.2 1.8 - 8.0 K/UL    ABS. LYMPHOCYTES 2.3 0.8 - 3.5 K/UL    ABS. MONOCYTES 0.7 0.0 - 1.0 K/UL    ABS. EOSINOPHILS 0.1 0.0 - 0.4 K/UL    ABS. BASOPHILS 0.1 0.0 - 0.1 K/UL    ABS. IMM. GRANS. 0.0 0.00 - 0.04 K/UL    DF AUTOMATED     PROTHROMBIN TIME + INR    Collection Time: 05/07/21  6:45 AM   Result Value Ref Range    Prothrombin time 13.9 11.9 - 14.7 sec    INR 1.1 0.9 - 1.1     METABOLIC PANEL, COMPREHENSIVE    Collection Time: 05/07/21  6:45 AM   Result Value Ref Range    Sodium 138 136 - 145 mmol/L    Potassium 3.5 3.5 - 5.1 mmol/L    Chloride 104 97 - 108 mmol/L    CO2 26 21 - 32 mmol/L    Anion gap 8 5 - 15 mmol/L    Glucose 107 (H) 65 - 100 mg/dL    BUN 11 6 - 20 mg/dL    Creatinine 0.96 0.70 - 1.30 mg/dL    BUN/Creatinine ratio 11 (L) 12 - 20      GFR est AA >60 >60 ml/min/1.73m2    GFR est non-AA >60 >60 ml/min/1.73m2    Calcium 9.0 8.5 - 10.1 mg/dL    Bilirubin, total 0.8 0.2 - 1.0 mg/dL    AST (SGOT) 68 (H) 15 - 37 U/L    ALT (SGPT) 58 12 - 78 U/L    Alk.  phosphatase 108 45 - 117 U/L    Protein, total 7.9 6.4 - 8.2 g/dL    Albumin 3.7 3.5 - 5.0 g/dL    Globulin 4.2 (H) 2.0 - 4.0 g/dL    A-G Ratio 0.9 (L) 1.1 - 2.2     TROPONIN I    Collection Time: 05/07/21  6:45 AM   Result Value Ref Range    Troponin-I, Qt. 0.12 (H) <0.05 ng/mL   MAGNESIUM    Collection Time: 05/07/21  6:45 AM   Result Value Ref Range    Magnesium 1.8 1.6 - 2.4 mg/dL   BNP Collection Time: 05/07/21  6:45 AM   Result Value Ref Range    NT pro-BNP 2,197 (H) <125 pg/mL       Labs reviewed by me    Radiologic Studies -   XR CHEST PORT   Final Result        CT Results  (Last 48 hours)    None        CXR Results  (Last 48 hours)               05/07/21 0658  XR CHEST PORT Final result    Narrative:  Chest single view       Minor coarse reticular markings, basilar lung predominance. No gross   interstitial or alveolar pulmonary edema. Cardiac and mediastinal structures   magnified on this AP portable upright view of the chest. No pneumothorax or   sizable pleural effusion. Medical Decision Making     I am the first provider for this patient. I reviewed the vital signs, available nursing notes, past medical history, past surgical history, family history and social history. RADIOLOGY report and LABS reviewed by me    Vital Signs-Reviewed the patient's vital signs. Patient Vitals for the past 12 hrs:   Temp Pulse Resp BP SpO2   05/07/21 0722 -- (!) 102 -- (!) 169/122 --   05/07/21 0633 97.9 °F (36.6 °C) 93 20 (!) 223/126 98 %       EKG interpretation: (Preliminary)  EKG done at 628 shows sinus tachycardia with ventricular rate of 101 with frequent PVCs, left ventricular hypertrophy, normal axis, ST segment depression in lateral leads        Records Reviewed: Nurse's note.       Provider Notes (Medical Decision Making):    Patient presents with DIFF DX : CHF, MI, acute coronary syndrome, hypertensive emergency      CRITICAL CARE NOTE :  8:11 AM  Amount of Critical Care Time: __60__(minutes)__    IMPENDING DETERIORATION -Cardiovascular  ASSOCIATED RISK FACTORS - Metabolic changes and Vascular Compromise  MANAGEMENT- Bedside Assessment  INTERPRETATION -  Xrays, ECG and Blood Pressure  INTERVENTIONS -   CASE REVIEW - Hospitalist/Intensivist  TREATMENT RESPONSE -Improved  PERFORMED BY - Self    NOTES   :  I have spent critical care time involved in lab review, consultations with specialist, family decision- making, bedside attention and documentation. This time excludes time spent in any separate billed procedures. During this entire length of time I was immediately available to the patient . Roslyn Garcia MD          ED Course:   Initial assessment performed. The patients presenting problems have been discussed, and they are in agreement with the care plan formulated and outlined with them. I have encouraged them to ask questions as they arise throughout their visit. TREATMENT RESPONSE -Stable          Roslyn Garcia MD      Disposition:  Admitted   Diagnostic tests were reviewed and questions answered. Diagnosis, care plan and treatment options were discussed. The patient understand instructions and will follow up as directed. Condition stable    Admitting Provider:  Dashawn Apodaca MD     Consulting Provider:  No ref. provider found       DISCHARGE PLAN:  1. Current Discharge Medication List        2. Follow-up Information    None       3. Return to ED if worse     Diagnosis     Clinical Impression:     ICD-10-CM ICD-9-CM    1. Hypertensive emergency  I16.1 401.9    2. Congestive heart failure, unspecified HF chronicity, unspecified heart failure type (HCC)  I50.9 428.0    3. Elevated troponin  R77.8 790.6    4. History of atrial fibrillation  Z86.79 V12.59         Attestations:    Roslyn Garcia MD    Please note that this dictation was completed with [employer], the computer voice recognition software. Quite often unanticipated grammatical, syntax, homophones, and other interpretive errors are inadvertently transcribed by the computer software. Please disregard these errors. Please excuse any errors that have escaped final proofreading. Thank you.

## 2021-05-07 NOTE — Clinical Note
Sheath #1: Sheath: inserted. Sheath inserted/placed in the right radial  artery.  6F GLIDESHEATH SLENDER 10CM

## 2021-05-08 ENCOUNTER — APPOINTMENT (OUTPATIENT)
Dept: NON INVASIVE DIAGNOSTICS | Age: 61
DRG: 192 | End: 2021-05-08
Attending: HOSPITALIST
Payer: MEDICAID

## 2021-05-08 LAB
APTT PPP: 45 SEC (ref 21.2–34.1)
APTT PPP: 71.4 SEC (ref 21.2–34.1)
THERAPEUTIC RANGE,PTTT: ABNORMAL SEC (ref 82–109)
THERAPEUTIC RANGE,PTTT: ABNORMAL SEC (ref 82–109)

## 2021-05-08 PROCEDURE — 65270000029 HC RM PRIVATE

## 2021-05-08 PROCEDURE — 74011250637 HC RX REV CODE- 250/637: Performed by: HOSPITALIST

## 2021-05-08 PROCEDURE — 74011250636 HC RX REV CODE- 250/636: Performed by: HOSPITALIST

## 2021-05-08 PROCEDURE — 93306 TTE W/DOPPLER COMPLETE: CPT

## 2021-05-08 PROCEDURE — 85730 THROMBOPLASTIN TIME PARTIAL: CPT

## 2021-05-08 PROCEDURE — 80061 LIPID PANEL: CPT

## 2021-05-08 PROCEDURE — 36415 COLL VENOUS BLD VENIPUNCTURE: CPT

## 2021-05-08 RX ORDER — GUAIFENESIN 100 MG/5ML
81 LIQUID (ML) ORAL DAILY
Status: DISCONTINUED | OUTPATIENT
Start: 2021-05-09 | End: 2021-05-11 | Stop reason: HOSPADM

## 2021-05-08 RX ORDER — CLOPIDOGREL BISULFATE 75 MG/1
75 TABLET ORAL DAILY
Status: DISCONTINUED | OUTPATIENT
Start: 2021-05-09 | End: 2021-05-10

## 2021-05-08 RX ADMIN — FUROSEMIDE 40 MG: 10 INJECTION, SOLUTION INTRAMUSCULAR; INTRAVENOUS at 09:13

## 2021-05-08 RX ADMIN — LISINOPRIL 10 MG: 10 TABLET ORAL at 09:12

## 2021-05-08 RX ADMIN — HEPARIN SODIUM 2000 UNITS: 1000 INJECTION, SOLUTION INTRAVENOUS; SUBCUTANEOUS at 09:19

## 2021-05-08 RX ADMIN — CARVEDILOL 12.5 MG: 12.5 TABLET, FILM COATED ORAL at 09:12

## 2021-05-08 RX ADMIN — HEPARIN SODIUM 16 UNITS/KG/HR: 10000 INJECTION, SOLUTION INTRAVENOUS at 19:44

## 2021-05-08 RX ADMIN — CARVEDILOL 12.5 MG: 12.5 TABLET, FILM COATED ORAL at 17:00

## 2021-05-08 RX ADMIN — HEPARIN SODIUM 2000 UNITS: 1000 INJECTION, SOLUTION INTRAVENOUS; SUBCUTANEOUS at 19:45

## 2021-05-08 NOTE — PROGRESS NOTES
Hospitalist Progress Note         Isamar PIA Mosley, FNP-C    Daily Progress Note: 5/8/2021      Subjective:   Subjective   Patient seen on f/u alert and oriented lying in bed  Reports sob with ambulation  No acute distress noted on examination    Review of Systems:   Review of Systems   Constitutional: Negative. HENT: Negative. Eyes: Negative. Respiratory: Positive for shortness of breath. Cardiovascular: Negative. Gastrointestinal: Negative. Genitourinary: Negative. Musculoskeletal: Negative. Skin: Negative. Neurological: Negative. Endo/Heme/Allergies: Negative. Psychiatric/Behavioral: Negative. Objective:   Objective      Vitals:  Patient Vitals for the past 12 hrs:   Temp Pulse Resp BP SpO2   05/08/21 0852 97.5 °F (36.4 °C) 77 20 (!) 149/99 --   05/08/21 0517 97.4 °F (36.3 °C) 79 20 (!) 157/105 99 %        Physical Exam:  Physical Exam  Vitals signs and nursing note reviewed. Constitutional:       Appearance: Normal appearance. HENT:      Head: Normocephalic. Nose: Nose normal.      Mouth/Throat:      Mouth: Mucous membranes are moist.   Eyes:      Extraocular Movements: Extraocular movements intact. Neck:      Musculoskeletal: Normal range of motion. Cardiovascular:      Rate and Rhythm: Tachycardia present. Rhythm irregular. Pulses: Normal pulses. Pulmonary:      Effort: Pulmonary effort is normal.      Breath sounds: Normal breath sounds. Abdominal:      General: Bowel sounds are normal.      Palpations: Abdomen is soft. Musculoskeletal: Normal range of motion. Skin:     General: Skin is warm and dry. Capillary Refill: Capillary refill takes less than 2 seconds. Neurological:      Mental Status: He is alert and oriented to person, place, and time.    Psychiatric:         Mood and Affect: Mood normal.         Behavior: Behavior normal.          Lab Results:  Recent Results (from the past 24 hour(s))   TROPONIN I    Collection Time: 05/07/21  1:00 PM   Result Value Ref Range    Troponin-I, Qt. 0.10 (H) <0.05 ng/mL   PTT    Collection Time: 05/08/21  5:22 AM   Result Value Ref Range    aPTT 45.0 (H) 21.2 - 34.1 sec    aPTT, therapeutic range   82 - 109 sec          Diagnostic Images:  CT Results  (Last 48 hours)    None          Current Medications:    Current Facility-Administered Medications:     dilTIAZem (CARDIZEM) 125 mg/125 mL (1 mg/mL) dextrose 5% infusion, 5 mg/hr, IntraVENous, TITRATE, Juan Candelaria H, DO, Last Rate: 5 mL/hr at 05/07/21 1852, 5 mg/hr at 05/07/21 1852    [Held by provider] apixaban (ELIQUIS) tablet 5 mg, 5 mg, Oral, BID, Onelia Barrett MD, Stopped at 05/07/21 2100    carvediloL (COREG) tablet 12.5 mg, 12.5 mg, Oral, BID WITH MEALS, Onelia Barrett MD, 12.5 mg at 05/08/21 0912    furosemide (LASIX) injection 40 mg, 40 mg, IntraVENous, DAILY, Onelia Barrett MD, 40 mg at 05/08/21 0913    labetaloL (NORMODYNE;TRANDATE) 20 mg/4 mL (5 mg/mL) injection 10 mg, 10 mg, IntraVENous, Q4H PRN, Onelia Barrett MD    lisinopriL (PRINIVIL, ZESTRIL) tablet 10 mg, 10 mg, Oral, DAILY, Onelia Barrett MD, 10 mg at 05/08/21 0912    heparin (porcine) 1,000 unit/mL injection 4,000 Units, 4,000 Units, IntraVENous, PRN **OR** heparin (porcine) 1,000 unit/mL injection 2,000 Units, 2,000 Units, IntraVENous, PRN, Onelia Barrett MD, 2,000 Units at 05/08/21 0919    heparin 25,000 units in D5W 250 ml infusion, 12-25 Units/kg/hr (Adjusted), IntraVENous, CONTINUOUS, Onelia Barrett MD, Last Rate: 12.1 mL/hr at 05/08/21 0919, 14 Units/kg/hr at 05/08/21 0919       ASSESSMENT:    1. Acute CHF exacerbation: bnp 2197 on admission. obtain echo, continue lisinopril 10mg, carvedilol 12.5mg bid, lasix 40mg daily. Trop elevated indeterminate range. 2. Atrial fibrillation: continue hep gtt and cardizem gtt. Follow echo report, cardiology following.     3. HTN urgency: bp at goal per JNC 8 guidelines, continue carvedilol, lisinopril, and prn labetolol    4. CAD s/p stent placement: eliquis on hold        Full Code  Heparin gtt Dvt Prophylaxis  GI Prophylaxis not needed, tolerating diet well  Home medications reviewed and reconciled      Above treatment plan reviewed and discussed with patient in detail at bedside, all questions answered. Care Plan discussed with: Interdisciplinary     Total time spent with patient: 25 minutes.     Misa Da Silva NP

## 2021-05-08 NOTE — PROGRESS NOTES
Progress Note      5/8/2021 1:54 PM  NAME: Prosper Garcia   MRN:  [id number]   Admit Diagnosis: Heart failure ([facility] [address] [I50.9]  HTN (hypertension), malignant [I10]      Problem List:   Atrial fibrillation with controlled HR  Acute CHF decompensation  Troponin elevation, non-Q wave MI  Ischemic cardiomyopathy  Tobacco addiction  CAD s/p MI w coronary stents 2018     Assessment/Plan:   Cardiac catheterization planned for Monday  DAPT  Beta-blockers for heart rate control  Complete echocardiogram to assess EF and filling pressure         []       High complexity decision making was performed in this patient at high risk for decompensation with multiple organ involvement. Subjective:     Prosper Garcia denies chest pain, dyspnea. Discussed with RN events overnight. Review of Systems:   Negative except for as noted above. Objective:      Physical Exam:    Last 24hrs VS reviewed since prior progress note. Most recent are:    Visit Vitals  /72   Pulse 66   Temp 97.7 °F (36.5 °C)   Resp 20   Ht 5' 11\" (1.803 m)   Wt 103.9 kg (229 lb)   SpO2 100%   BMI 31.94 kg/m²     No intake or output data in the 24 hours ending 05/08/21 1354     General Appearance: Well developed, well nourished, alert; no acute distress. Ears/Nose/Mouth/Throat: Hearing grossly normal; moist mucous membranes  Neck: Supple. Chest: Lungs clear to auscultation bilaterally. Cardiovascular: Regular rate and rhythm, S1S2 normal, no murmur. Abdomen: Soft, non-tender, bowel sounds are active. Extremities: No edema bilaterally. Skin: Warm and dry. []         Post-cath site without hematoma, bruit, tenderness, or thrill. Distal pulses intact.     PMH/SH reviewed - no change compared to H&P    Telemetry: Atrial fibrillation with heart rate in the 70s    EKG: Atrial fibrillation with nonspecific ST-T changes    []  No new EKG for review    Lab Data Personally Reviewed:    Recent Labs     05/07/21  0645   WBC 9.4   HGB 15.7   HCT 49.3        Recent Labs     05/08/21  0522 05/07/21  0645   INR  --  1.1   PTP  --  13.9   APTT 45.0*  --       Recent Labs     05/07/21  0645      K 3.5      CO2 26   BUN 11   CREA 0.96   *   CA 9.0   MG 1.8     Recent Labs     05/07/21  1300 05/07/21  1006 05/07/21  0645   TROIQ 0.10* 0.11* 0.12*     No results found for: CHOL, CHOLX, CHLST, CHOLV, HDL, HDLP, LDL, LDLC, DLDLP, TGLX, TRIGL, TRIGP, CHHD, CHHDX    Recent Labs     05/07/21  0645      TP 7.9   ALB 3.7   GLOB 4.2*     No results for input(s): PH, PCO2, PO2 in the last 72 hours.     Medications Personally Reviewed:    Current Facility-Administered Medications   Medication Dose Route Frequency    dilTIAZem (CARDIZEM) 125 mg/125 mL (1 mg/mL) dextrose 5% infusion  5 mg/hr IntraVENous TITRATE    [Held by provider] apixaban (ELIQUIS) tablet 5 mg  5 mg Oral BID    carvediloL (COREG) tablet 12.5 mg  12.5 mg Oral BID WITH MEALS    furosemide (LASIX) injection 40 mg  40 mg IntraVENous DAILY    labetaloL (NORMODYNE;TRANDATE) 20 mg/4 mL (5 mg/mL) injection 10 mg  10 mg IntraVENous Q4H PRN    lisinopriL (PRINIVIL, ZESTRIL) tablet 10 mg  10 mg Oral DAILY    heparin (porcine) 1,000 unit/mL injection 4,000 Units  4,000 Units IntraVENous PRN    Or    heparin (porcine) 1,000 unit/mL injection 2,000 Units  2,000 Units IntraVENous PRN    heparin 25,000 units in D5W 250 ml infusion  12-25 Units/kg/hr (Adjusted) IntraVENous CONTINUOUS         Elizabeth Garibay MD

## 2021-05-09 LAB
APTT PPP: 83 SEC (ref 21.2–34.1)
APTT PPP: 93 SEC (ref 21.2–34.1)
APTT PPP: 95.2 SEC (ref 21.2–34.1)
ECHO AO ROOT DIAM: 3.7 CM
ECHO AV PEAK GRADIENT: 5 MMHG
ECHO LA AREA 4C: 33.22 CM2
ECHO LA MAJOR AXIS: 5 CM
ECHO LA MINOR AXIS: 2.25 CM
ECHO LV E' SEPTAL VELOCITY: 7.8 CM/S
ECHO LV EDV A2C: 121 CM3
ECHO LV EDV A2C: 156 CM3
ECHO LV EDV A4C: 129 CM3
ECHO LV EJECTION FRACTION BIPLANE: 46.6 % (ref 55–100)
ECHO LV ESV A2C: 69.4 CM3
ECHO LV ESV A4C: 76 CM3
ECHO LV INTERNAL DIMENSION DIASTOLIC: 5.38 CM (ref 4.2–5.9)
ECHO LV INTERNAL DIMENSION SYSTOLIC: 4.11 CM
ECHO LV IVSD: 1.42 CM (ref 0.6–1)
ECHO LV MASS 2D: 350.2 G (ref 88–224)
ECHO LV MASS INDEX 2D: 157.6 G/M2 (ref 49–115)
ECHO LV POSTERIOR WALL DIASTOLIC: 1.52 CM (ref 0.6–1)
ECHO LVOT PEAK GRADIENT: 4 MMHG
ECHO MV E DECELERATION TIME (DT): 232 MS
ECHO MV E VELOCITY: 82.3 CM/S
ECHO MV E/E' SEPTAL: 10.55
ECHO PV PEAK INSTANTANEOUS GRADIENT SYSTOLIC: 1 MMHG
ECHO PV REGURGITANT MAX VELOCITY: 109 CM/S
ECHO PV REGURGITANT MAX VELOCITY: 58.2 CM/S
ECHO PV REGURGITANT MAX VELOCITY: 93.8 CM/S
ECHO RA AREA 4C: 25.43 CM2
ECHO RV INTERNAL DIMENSION: 5.11 CM
ECHO TV MAX VELOCITY: 235 CM/S
ECHO TV REGURGITANT PEAK GRADIENT: 22 MMHG
THERAPEUTIC RANGE,PTTT: ABNORMAL SEC (ref 82–109)

## 2021-05-09 PROCEDURE — 36415 COLL VENOUS BLD VENIPUNCTURE: CPT

## 2021-05-09 PROCEDURE — 85730 THROMBOPLASTIN TIME PARTIAL: CPT

## 2021-05-09 PROCEDURE — 65270000029 HC RM PRIVATE

## 2021-05-09 PROCEDURE — 74011250637 HC RX REV CODE- 250/637: Performed by: INTERNAL MEDICINE

## 2021-05-09 PROCEDURE — 74011250637 HC RX REV CODE- 250/637: Performed by: HOSPITALIST

## 2021-05-09 PROCEDURE — 74011250636 HC RX REV CODE- 250/636: Performed by: HOSPITALIST

## 2021-05-09 RX ADMIN — FUROSEMIDE 40 MG: 10 INJECTION, SOLUTION INTRAMUSCULAR; INTRAVENOUS at 08:39

## 2021-05-09 RX ADMIN — ASPIRIN 81 MG: 81 TABLET, CHEWABLE ORAL at 08:39

## 2021-05-09 RX ADMIN — LISINOPRIL 10 MG: 10 TABLET ORAL at 08:39

## 2021-05-09 RX ADMIN — CARVEDILOL 12.5 MG: 12.5 TABLET, FILM COATED ORAL at 16:44

## 2021-05-09 RX ADMIN — CLOPIDOGREL BISULFATE 75 MG: 75 TABLET ORAL at 08:39

## 2021-05-09 RX ADMIN — CARVEDILOL 12.5 MG: 12.5 TABLET, FILM COATED ORAL at 08:39

## 2021-05-09 RX ADMIN — HEPARIN SODIUM 16 UNITS/KG/HR: 10000 INJECTION, SOLUTION INTRAVENOUS at 16:49

## 2021-05-09 NOTE — PROGRESS NOTES
Hospitalist Progress Note         PIA Higginbotham, FNP-C    Daily Progress Note: 5/9/2021      Subjective:   Subjective   Patient seen on f/u alert and oriented lying in bed  Patient denies chest pain or shortness of breath at this time  No acute distress noted on examination    Review of Systems:   Review of Systems   Constitutional: Negative. HENT: Negative. Eyes: Negative. Respiratory: Positive for shortness of breath. Cardiovascular: Negative. Gastrointestinal: Negative. Genitourinary: Negative. Musculoskeletal: Negative. Skin: Negative. Neurological: Negative. Endo/Heme/Allergies: Negative. Psychiatric/Behavioral: Negative. Objective:   Objective      Vitals:  Patient Vitals for the past 12 hrs:   Temp Pulse Resp BP SpO2   05/09/21 0723 97.5 °F (36.4 °C) (!) 56 20 (!) 148/106 98 %   05/08/21 2223 98.2 °F (36.8 °C) 88 20 (!) 144/96 98 %        Physical Exam:  Physical Exam  Vitals signs and nursing note reviewed. Constitutional:       Appearance: Normal appearance. HENT:      Head: Normocephalic. Nose: Nose normal.      Mouth/Throat:      Mouth: Mucous membranes are moist.   Eyes:      Extraocular Movements: Extraocular movements intact. Neck:      Musculoskeletal: Normal range of motion. Cardiovascular:      Rate and Rhythm: Normal rate and regular rhythm. Pulses: Normal pulses. Pulmonary:      Effort: Pulmonary effort is normal.      Breath sounds: Normal breath sounds. Abdominal:      General: Bowel sounds are normal.      Palpations: Abdomen is soft. Musculoskeletal: Normal range of motion. Skin:     General: Skin is warm and dry. Capillary Refill: Capillary refill takes less than 2 seconds. Neurological:      Mental Status: He is alert and oriented to person, place, and time.    Psychiatric:         Mood and Affect: Mood normal.         Behavior: Behavior normal.          Lab Results:  Recent Results (from the past 24 hour(s)) ECHO ADULT COMPLETE    Collection Time: 05/08/21  2:18 PM   Result Value Ref Range    LV ED Vol A2C 121.00 cm3    LV ED Vol A4C 129.00 cm3    LV ED Vol A2C 156.00 cm3    LV ES Vol A4C 76.00 cm3    LV ES Vol A2C 69.40 cm3    IVSd 1.42 (A) 0.60 - 1.00 cm    LVIDd 5.38 4.20 - 5.90 cm    LVIDs 4.11 cm    Pulmonic Regurgitant End Max Velocity 93.80 cm/s    LVOT Peak Gradient 4.00 mmHg    LVPWd 1.52 (A) 0.60 - 1.00 cm    LV E' Septal Velocity 7.80 cm/s    BP EF 46.6 55.0 - 100.0 %    E/E' septal 10.55     Pulmonic Regurgitant End Max Velocity 109.00 cm/s    AoV PG 5.00 mmHg    Mitral Valve E Wave Deceleration Time 232.00 ms    MV E Juice 82.30 cm/s    Pulmonic Regurgitant End Max Velocity 58.20 cm/s    Pulmonic Valve Systolic Peak Instantaneous Gradient 1.00 mmHg    RVIDd 5.11 cm    Tricuspid Valve Max Velocity 235.00 cm/s    Triscuspid Valve Regurgitation Peak Gradient 22.00 mmHg    Right Atrial Area 4C 25.43 cm2    LA Area 4C 33.22 cm2    LV Mass .2 88.0 - 224.0 g    LV Mass AL Index 157.6 49.0 - 115.0 g/m2    Left Atrium Minor Axis 2.25 cm    Left Atrium Major Axis 5.00 cm    Ao Root D 3.70 cm   PTT    Collection Time: 05/08/21  4:53 PM   Result Value Ref Range    aPTT 71.4 (H) 21.2 - 34.1 sec    aPTT, therapeutic range   82 - 109 sec   PTT    Collection Time: 05/09/21  1:47 AM   Result Value Ref Range    aPTT 95.2 (H) 21.2 - 34.1 sec    aPTT, therapeutic range   82 - 109 sec          Diagnostic Images:  CT Results  (Last 48 hours)    None          Current Medications:    Current Facility-Administered Medications:     aspirin chewable tablet 81 mg, 81 mg, Oral, DAILY, Erin Bella MD, 81 mg at 05/09/21 0839    clopidogreL (PLAVIX) tablet 75 mg, 75 mg, Oral, DAILY, Erin Bella MD, 75 mg at 05/09/21 0839    [Held by provider] apixaban (ELIQUIS) tablet 5 mg, 5 mg, Oral, BID, John Frankel MD, Stopped at 05/07/21 2100    carvediloL (COREG) tablet 12.5 mg, 12.5 mg, Oral, BID WITH MEALS, Esperanza Bassem Ferraro MD, 12.5 mg at 05/09/21 0839    furosemide (LASIX) injection 40 mg, 40 mg, IntraVENous, DAILY, Elma Parsons MD, 40 mg at 05/09/21 0839    labetaloL (NORMODYNE;TRANDATE) 20 mg/4 mL (5 mg/mL) injection 10 mg, 10 mg, IntraVENous, Q4H PRN, Elma Parsons MD    lisinopriL (PRINIVIL, ZESTRIL) tablet 10 mg, 10 mg, Oral, DAILY, Elma Parsons MD, 10 mg at 05/09/21 0839    heparin (porcine) 1,000 unit/mL injection 4,000 Units, 4,000 Units, IntraVENous, PRN **OR** heparin (porcine) 1,000 unit/mL injection 2,000 Units, 2,000 Units, IntraVENous, PRN, Elma Parsons MD, 2,000 Units at 05/08/21 1945    heparin 25,000 units in D5W 250 ml infusion, 12-25 Units/kg/hr (Adjusted), IntraVENous, CONTINUOUS, Elma Parsons MD, Last Rate: 13.9 mL/hr at 05/09/21 0200, 16 Units/kg/hr at 05/09/21 0200       ASSESSMENT:    1. Acute CHF exacerbation: bnp 2197 on admission. obtain echo, continue lisinopril 10mg, carvedilol 12.5mg bid, lasix 40mg daily. Trop elevated indeterminate range. Left heart cath plan in a.m., n.p.o. after midnight. Continue IV heparin drip, DAPT therapy. 2. Atrial fibrillation: continue hep gtt. Cardizem drip discontinued as patient is now in normal sinus rhythm. Follow echo report, cardiology following. 3. HTN urgency: bp at goal per JNC 8 guidelines, continue carvedilol, lisinopril, and prn labetolol    4. CAD s/p stent placement: eliquis on hold, continue DAPT therapy, heparin drip. Full Code  Heparin gtt Dvt Prophylaxis  GI Prophylaxis not needed, tolerating diet well  Home medications reviewed and reconciled      Above treatment plan reviewed and discussed with patient in detail at bedside, all questions answered. Care Plan discussed with: Interdisciplinary     Total time spent with patient: 25 minutes.     Mahi Hart NP

## 2021-05-09 NOTE — PROGRESS NOTES
Progress Note      5/9/2021 1:54 PM  NAME: Yeny Ferrell   MRN:  [id number]   Admit Diagnosis: Heart failure ([facility] [address] [I50.9]  HTN (hypertension), malignant [I10]      Problem List:   Atrial fibrillation with controlled HR  Acute CHF decompensation  Acute non-Q wave MI  Ischemic cardiomyopathy  Tobacco addiction  CAD s/p MI w coronary stents 2018     Assessment/Plan:   Cardiac catheterization planned for tomorrow  DAPT  Beta-blockers for heart rate control  Echo shows EF mildly reduced at 47%         []       High complexity decision making was performed in this patient at high risk for decompensation with multiple organ involvement. Subjective:     Yeny Ferrell denies chest pain, dyspnea. Discussed with RN events overnight. Review of Systems:   Negative except for as noted above. Objective:      Physical Exam:    Last 24hrs VS reviewed since prior progress note. Most recent are:    Visit Vitals  BP (!) 175/119   Pulse 86   Temp 97.6 °F (36.4 °C)   Resp 20   Ht 5' 10.87\" (1.8 m)   Wt 96.1 kg (211 lb 13.8 oz)   SpO2 99%   BMI 29.66 kg/m²     No intake or output data in the 24 hours ending 05/09/21 1622     General Appearance: Well developed, well nourished, alert; no acute distress. Ears/Nose/Mouth/Throat: Hearing grossly normal; moist mucous membranes  Neck: Supple. Chest: Lungs clear to auscultation bilaterally. Cardiovascular: Regular rate and rhythm, S1S2 normal, no murmur. Abdomen: Soft, non-tender, bowel sounds are active. Extremities: No edema bilaterally. Skin: Warm and dry. []         Post-cath site without hematoma, bruit, tenderness, or thrill. Distal pulses intact. PMH/SH reviewed - no change compared to H&P    Telemetry: Atrial fibrillation with heart rate in the 70s    Echo 5/8/2021:  · LV: Calculated LVEF is 47%. Normal cavity size. Mild concentric hypertrophy. Globally reduced systolic function. · LA: Moderately dilated left atrium.   · RA: Mildly dilated right atrium. · MV: Mitral valve non-specific thickening. Mild mitral valve regurgitation is present. · TV: Mild to moderate tricuspid valve regurgitation is present       []  No new EKG for review    Lab Data Personally Reviewed:    Recent Labs     05/07/21  0645   WBC 9.4   HGB 15.7   HCT 49.3        Recent Labs     05/09/21  1104 05/09/21  0147 05/08/21  1653 05/07/21  0645 05/07/21  0645   INR  --   --   --   --  1.1   PTP  --   --   --   --  13.9   APTT 93.0* 95.2* 71.4*   < >  --     < > = values in this interval not displayed. Recent Labs     05/07/21  0645      K 3.5      CO2 26   BUN 11   CREA 0.96   *   CA 9.0   MG 1.8     Recent Labs     05/07/21  1300 05/07/21  1006 05/07/21  0645   TROIQ 0.10* 0.11* 0.12*     No results found for: CHOL, CHOLX, CHLST, CHOLV, HDL, HDLP, LDL, LDLC, DLDLP, TGLX, TRIGL, TRIGP, CHHD, CHHDX    Recent Labs     05/07/21  0645      TP 7.9   ALB 3.7   GLOB 4.2*     No results for input(s): PH, PCO2, PO2 in the last 72 hours.     Medications Personally Reviewed:    Current Facility-Administered Medications   Medication Dose Route Frequency    aspirin chewable tablet 81 mg  81 mg Oral DAILY    clopidogreL (PLAVIX) tablet 75 mg  75 mg Oral DAILY    [Held by provider] apixaban (ELIQUIS) tablet 5 mg  5 mg Oral BID    carvediloL (COREG) tablet 12.5 mg  12.5 mg Oral BID WITH MEALS    furosemide (LASIX) injection 40 mg  40 mg IntraVENous DAILY    labetaloL (NORMODYNE;TRANDATE) 20 mg/4 mL (5 mg/mL) injection 10 mg  10 mg IntraVENous Q4H PRN    lisinopriL (PRINIVIL, ZESTRIL) tablet 10 mg  10 mg Oral DAILY    heparin (porcine) 1,000 unit/mL injection 4,000 Units  4,000 Units IntraVENous PRN    Or    heparin (porcine) 1,000 unit/mL injection 2,000 Units  2,000 Units IntraVENous PRN    heparin 25,000 units in D5W 250 ml infusion  12-25 Units/kg/hr (Adjusted) IntraVENous CONTINUOUS         Ayesha Quiñones MD

## 2021-05-10 LAB
APTT PPP: 35.1 SEC (ref 21.2–34.1)
APTT PPP: 98.3 SEC (ref 21.2–34.1)
CHOLEST SERPL-MCNC: 139 MG/DL
HDLC SERPL-MCNC: 50 MG/DL
HDLC SERPL: 2.8 {RATIO} (ref 0–5)
LDLC SERPL CALC-MCNC: 69.4 MG/DL (ref 0–100)
LIPID PROFILE,FLP: NORMAL
THERAPEUTIC RANGE,PTTT: ABNORMAL SEC (ref 82–109)
THERAPEUTIC RANGE,PTTT: ABNORMAL SEC (ref 82–109)
TRIGL SERPL-MCNC: 98 MG/DL (ref ?–150)
VLDLC SERPL CALC-MCNC: 19.6 MG/DL

## 2021-05-10 PROCEDURE — 65270000029 HC RM PRIVATE

## 2021-05-10 PROCEDURE — 74011250636 HC RX REV CODE- 250/636: Performed by: INTERNAL MEDICINE

## 2021-05-10 PROCEDURE — 74011250636 HC RX REV CODE- 250/636: Performed by: HOSPITALIST

## 2021-05-10 PROCEDURE — 74011000250 HC RX REV CODE- 250: Performed by: INTERNAL MEDICINE

## 2021-05-10 PROCEDURE — 77030015766: Performed by: INTERNAL MEDICINE

## 2021-05-10 PROCEDURE — C1769 GUIDE WIRE: HCPCS | Performed by: INTERNAL MEDICINE

## 2021-05-10 PROCEDURE — C1894 INTRO/SHEATH, NON-LASER: HCPCS | Performed by: INTERNAL MEDICINE

## 2021-05-10 PROCEDURE — 4A023N7 MEASUREMENT OF CARDIAC SAMPLING AND PRESSURE, LEFT HEART, PERCUTANEOUS APPROACH: ICD-10-PCS | Performed by: INTERNAL MEDICINE

## 2021-05-10 PROCEDURE — 76210000020 HC REC RM PH II FIRST 0.5 HR: Performed by: INTERNAL MEDICINE

## 2021-05-10 PROCEDURE — 99152 MOD SED SAME PHYS/QHP 5/>YRS: CPT | Performed by: INTERNAL MEDICINE

## 2021-05-10 PROCEDURE — 76210000006 HC OR PH I REC 0.5 TO 1 HR: Performed by: INTERNAL MEDICINE

## 2021-05-10 PROCEDURE — 74011000636 HC RX REV CODE- 636: Performed by: INTERNAL MEDICINE

## 2021-05-10 PROCEDURE — 99153 MOD SED SAME PHYS/QHP EA: CPT | Performed by: INTERNAL MEDICINE

## 2021-05-10 PROCEDURE — 77030019698 HC SYR ANGI MDLON MRTM -A: Performed by: INTERNAL MEDICINE

## 2021-05-10 PROCEDURE — 77030029997 HC DEV COM RDL R BND TELE -B: Performed by: INTERNAL MEDICINE

## 2021-05-10 PROCEDURE — 74011250637 HC RX REV CODE- 250/637: Performed by: HOSPITALIST

## 2021-05-10 PROCEDURE — 36415 COLL VENOUS BLD VENIPUNCTURE: CPT

## 2021-05-10 PROCEDURE — 85730 THROMBOPLASTIN TIME PARTIAL: CPT

## 2021-05-10 PROCEDURE — 93458 L HRT ARTERY/VENTRICLE ANGIO: CPT | Performed by: INTERNAL MEDICINE

## 2021-05-10 PROCEDURE — 74011250637 HC RX REV CODE- 250/637: Performed by: INTERNAL MEDICINE

## 2021-05-10 PROCEDURE — 85347 COAGULATION TIME ACTIVATED: CPT

## 2021-05-10 RX ORDER — SODIUM CHLORIDE 9 MG/ML
INJECTION, SOLUTION INTRAVENOUS
Status: COMPLETED | OUTPATIENT
Start: 2021-05-10 | End: 2021-05-10

## 2021-05-10 RX ORDER — NITROGLYCERIN 5 MG/ML
INJECTION, SOLUTION INTRAVENOUS AS NEEDED
Status: DISCONTINUED | OUTPATIENT
Start: 2021-05-10 | End: 2021-05-10 | Stop reason: HOSPADM

## 2021-05-10 RX ORDER — VERAPAMIL HYDROCHLORIDE 2.5 MG/ML
INJECTION, SOLUTION INTRAVENOUS AS NEEDED
Status: DISCONTINUED | OUTPATIENT
Start: 2021-05-10 | End: 2021-05-10 | Stop reason: HOSPADM

## 2021-05-10 RX ORDER — DIPHENHYDRAMINE HYDROCHLORIDE 50 MG/ML
INJECTION, SOLUTION INTRAMUSCULAR; INTRAVENOUS AS NEEDED
Status: DISCONTINUED | OUTPATIENT
Start: 2021-05-10 | End: 2021-05-10 | Stop reason: HOSPADM

## 2021-05-10 RX ORDER — MIDAZOLAM HYDROCHLORIDE 1 MG/ML
INJECTION INTRAMUSCULAR; INTRAVENOUS AS NEEDED
Status: DISCONTINUED | OUTPATIENT
Start: 2021-05-10 | End: 2021-05-10 | Stop reason: HOSPADM

## 2021-05-10 RX ORDER — HEPARIN SODIUM 200 [USP'U]/100ML
INJECTION, SOLUTION INTRAVENOUS
Status: COMPLETED | OUTPATIENT
Start: 2021-05-10 | End: 2021-05-10

## 2021-05-10 RX ORDER — HEPARIN SODIUM 1000 [USP'U]/ML
INJECTION, SOLUTION INTRAVENOUS; SUBCUTANEOUS AS NEEDED
Status: DISCONTINUED | OUTPATIENT
Start: 2021-05-10 | End: 2021-05-10 | Stop reason: HOSPADM

## 2021-05-10 RX ORDER — LIDOCAINE HYDROCHLORIDE 10 MG/ML
INJECTION INFILTRATION; PERINEURAL AS NEEDED
Status: DISCONTINUED | OUTPATIENT
Start: 2021-05-10 | End: 2021-05-10 | Stop reason: HOSPADM

## 2021-05-10 RX ORDER — FENTANYL CITRATE 50 UG/ML
INJECTION, SOLUTION INTRAMUSCULAR; INTRAVENOUS AS NEEDED
Status: DISCONTINUED | OUTPATIENT
Start: 2021-05-10 | End: 2021-05-10 | Stop reason: HOSPADM

## 2021-05-10 RX ADMIN — CARVEDILOL 12.5 MG: 12.5 TABLET, FILM COATED ORAL at 10:08

## 2021-05-10 RX ADMIN — ASPIRIN 81 MG: 81 TABLET, CHEWABLE ORAL at 10:07

## 2021-05-10 RX ADMIN — CARVEDILOL 12.5 MG: 12.5 TABLET, FILM COATED ORAL at 17:02

## 2021-05-10 RX ADMIN — FUROSEMIDE 40 MG: 10 INJECTION, SOLUTION INTRAMUSCULAR; INTRAVENOUS at 10:08

## 2021-05-10 RX ADMIN — LISINOPRIL 10 MG: 10 TABLET ORAL at 10:08

## 2021-05-10 RX ADMIN — HEPARIN SODIUM 16 UNITS/KG/HR: 10000 INJECTION, SOLUTION INTRAVENOUS at 10:10

## 2021-05-10 RX ADMIN — CLOPIDOGREL BISULFATE 75 MG: 75 TABLET ORAL at 10:08

## 2021-05-10 NOTE — PROGRESS NOTES
Progress Note      5/10/2021 2:31 PM  NAME: Fredy Colindres   MRN:  [id number]   Admit Diagnosis: Heart failure ([facility] [address] [I50.9]  HTN (hypertension), malignant [I10]      Problem List:     1. Elevated troponin  2. Status post stenting of the right coronary artery in 2018  3. Paroxysmal atrial fibrillation     Assessment/Plan:     1. Coronary arteriogram showed no significant obstructive coronary artery disease. Minimal luminal irregularities was noted in the entire 3 vessels. Stent in the proximal right coronary artery is patent. Medical management is planned. Heparin is discontinued. Will start Eliquis in a.m. if there is no bleeding from the access site in the right radial region. []       High complexity decision making was performed in this patient at high risk for decompensation with multiple organ involvement. Subjective:     Fredy Colindres denies chest pain, dyspnea. Discussed with RN events overnight. Review of Systems:    Symptom Y/N Comments  Symptom Y/N Comments   Fever/Chills N   Chest Pain N    Poor Appetite N   Edema N    Cough N   Abdominal Pain N    Sputum N   Joint Pain N    SOB/HILL N   Pruritis/Rash N    Nausea/vomit N   Tolerating PT/OT Y    Diarrhea N   Tolerating Diet Y    Constipation N   Other       Could NOT obtain due to:      Objective:      Physical Exam:    Last 24hrs VS reviewed since prior progress note. Most recent are:    Visit Vitals  BP (!) 132/96 (BP 1 Location: Left upper arm, BP Patient Position: At rest)   Pulse 85   Temp 97.7 °F (36.5 °C)   Resp 20   Ht 5' 10.87\" (1.8 m)   Wt 96.1 kg (211 lb 13.8 oz)   SpO2 97%   BMI 29.66 kg/m²       Intake/Output Summary (Last 24 hours) at 5/10/2021 1431  Last data filed at 5/10/2021 1212  Gross per 24 hour   Intake --   Output 600 ml   Net -600 ml        General Appearance: Well developed, well nourished, alert & oriented x 3,    no acute distress.   Ears/Nose/Mouth/Throat: Hearing grossly normal.  Neck: Supple. Chest: Lungs clear to auscultation bilaterally. Cardiovascular: Regular rate and rhythm, S1S2 normal, no murmur. Abdomen: Soft, non-tender, bowel sounds are active. Extremities: No edema bilaterally. Skin: Warm and dry. []         Post-cath site without hematoma, bruit, tenderness, or thrill. Distal pulses intact. PMH/SH reviewed - no change compared to H&P    Data Review    Telemetry: Atrial fibrillation with moderate ventricular response EKG:   []  No new EKG for review    Lab Data Personally Reviewed:    No results for input(s): WBC, HGB, HCT, PLT, HGBEXT, HCTEXT, PLTEXT in the last 72 hours. Recent Labs     05/10/21  0737 05/09/21  1916 05/09/21  1104   APTT 98.3* 83.0* 93.0*      No results for input(s): NA, K, CL, CO2, BUN, CREA, GLU, CA, MG in the last 72 hours. No results for input(s): CPK, CKNDX, TROIQ in the last 72 hours. No lab exists for component: CPKMB  No results found for: CHOL, CHOLX, CHLST, CHOLV, HDL, HDLP, LDL, LDLC, DLDLP, TGLX, TRIGL, TRIGP, CHHD, CHHDX    No results for input(s): AP, TBIL, TP, ALB, GLOB, GGT, AML, LPSE in the last 72 hours. No lab exists for component: SGOT, GPT, AMYP, HLPSE  No results for input(s): PH, PCO2, PO2 in the last 72 hours.     Medications Personally Reviewed:    Current Facility-Administered Medications   Medication Dose Route Frequency    [START ON 5/11/2021] apixaban (ELIQUIS) tablet 5 mg  5 mg Oral BID    aspirin chewable tablet 81 mg  81 mg Oral DAILY    carvediloL (COREG) tablet 12.5 mg  12.5 mg Oral BID WITH MEALS    furosemide (LASIX) injection 40 mg  40 mg IntraVENous DAILY    labetaloL (NORMODYNE;TRANDATE) 20 mg/4 mL (5 mg/mL) injection 10 mg  10 mg IntraVENous Q4H PRN    lisinopriL (PRINIVIL, ZESTRIL) tablet 10 mg  10 mg Oral DAILY    heparin (porcine) 1,000 unit/mL injection 4,000 Units  4,000 Units IntraVENous PRN    Or    heparin (porcine) 1,000 unit/mL injection 2,000 Units  2,000 Units IntraVENous PRN Discussed with the nursing staff  Elma Fields MD

## 2021-05-10 NOTE — PROGRESS NOTES
Hospitalist Progress Note         PIA Mccullough, FNP-C    Daily Progress Note: 5/10/2021      Subjective:   Subjective   Patient seen on f/u alert and oriented lying in bed  Patient denies chest pain or shortness of breath at this time  Reports his blood pressure was elevated this morning  No acute distress noted on examination    Review of Systems:   Review of Systems   Constitutional: Negative. HENT: Negative. Eyes: Negative. Respiratory: Negative. Cardiovascular: Negative. Gastrointestinal: Negative. Genitourinary: Negative. Musculoskeletal: Negative. Skin: Negative. Neurological: Negative. Endo/Heme/Allergies: Negative. Psychiatric/Behavioral: Negative. Objective:   Objective      Vitals:  Patient Vitals for the past 12 hrs:   Temp Pulse Resp BP SpO2   05/10/21 0706 97.8 °F (36.6 °C) 76 18 (!) 164/109 99 %   05/10/21 0427 98 °F (36.7 °C) 67 20 (!) 165/111 98 %   05/09/21 2359 97.9 °F (36.6 °C) 81 20 (!) 160/106 98 %   05/09/21 2033 98.1 °F (36.7 °C) 61 20 (!) 144/101 99 %        Physical Exam:  Physical Exam  Vitals signs and nursing note reviewed. Constitutional:       Appearance: Normal appearance. HENT:      Head: Normocephalic. Nose: Nose normal.      Mouth/Throat:      Mouth: Mucous membranes are moist.   Eyes:      Extraocular Movements: Extraocular movements intact. Neck:      Musculoskeletal: Normal range of motion. Cardiovascular:      Rate and Rhythm: Normal rate and regular rhythm. Pulses: Normal pulses. Pulmonary:      Effort: Pulmonary effort is normal.      Breath sounds: Normal breath sounds. Abdominal:      General: Bowel sounds are normal.      Palpations: Abdomen is soft. Musculoskeletal: Normal range of motion. Skin:     General: Skin is warm and dry. Capillary Refill: Capillary refill takes less than 2 seconds. Neurological:      Mental Status: He is alert and oriented to person, place, and time. Psychiatric:         Mood and Affect: Mood normal.         Behavior: Behavior normal.          Lab Results:  Recent Results (from the past 24 hour(s))   PTT    Collection Time: 05/09/21 11:04 AM   Result Value Ref Range    aPTT 93.0 (H) 21.2 - 34.1 sec    aPTT, therapeutic range   82 - 109 sec   PTT    Collection Time: 05/09/21  7:16 PM   Result Value Ref Range    aPTT 83.0 (H) 21.2 - 34.1 sec    aPTT, therapeutic range   82 - 109 sec          Diagnostic Images:  CT Results  (Last 48 hours)    None        Echocardiogram 5/9/2021:LVEF is 47%. Normal cavity size. Mild concentric hypertrophy. Globally reduced systolic function.     Current Medications:    Current Facility-Administered Medications:     aspirin chewable tablet 81 mg, 81 mg, Oral, DAILY, Gisselle Shay MD, 81 mg at 05/09/21 0839    clopidogreL (PLAVIX) tablet 75 mg, 75 mg, Oral, DAILY, Gisselle Shay MD, 75 mg at 05/09/21 0839    [Held by provider] apixaban (ELIQUIS) tablet 5 mg, 5 mg, Oral, BID, Minnie Mcclelland MD, Stopped at 05/07/21 2100    carvediloL (COREG) tablet 12.5 mg, 12.5 mg, Oral, BID WITH MEALS, Minnie Mcclelland MD, 12.5 mg at 05/09/21 1644    furosemide (LASIX) injection 40 mg, 40 mg, IntraVENous, DAILY, Minnie Mcclelland MD, 40 mg at 05/09/21 0839    labetaloL (NORMODYNE;TRANDATE) 20 mg/4 mL (5 mg/mL) injection 10 mg, 10 mg, IntraVENous, Q4H PRN, Minnie Mcclelland MD    lisinopriL (PRINIVIL, ZESTRIL) tablet 10 mg, 10 mg, Oral, DAILY, Minnie Mcclelland MD, 10 mg at 05/09/21 0839    heparin (porcine) 1,000 unit/mL injection 4,000 Units, 4,000 Units, IntraVENous, PRN **OR** heparin (porcine) 1,000 unit/mL injection 2,000 Units, 2,000 Units, IntraVENous, PRN, Minnie Mcclelland MD, 2,000 Units at 05/08/21 1945    heparin 25,000 units in D5W 250 ml infusion, 12-25 Units/kg/hr (Adjusted), IntraVENous, CONTINUOUS, Minnie Mcclelland MD, Last Rate: 13.9 mL/hr at 05/09/21 2046, 16 Units/kg/hr at 05/09/21 2046 ASSESSMENT:    1. Acute CHF exacerbation: bnp 2197 on admission. obtain echo, continue lisinopril 10mg, carvedilol 12.5mg bid, lasix 40mg daily. Trop elevated indeterminate range. Left heart cath planned today. Continue DAPT therapy, hold IV heparin drip prior to procedure. Echocardiogram shows mild EF reduction 47%    2. Atrial fibrillation: on hep gtt. Cardizem drip discontinued as patient is now in normal sinus rhythm. Follow echo report, cardiology following. Echocardiogram shows mild EF reduction 47%    3. HTN urgency: bp elevated, continue carvedilol, lisinopril, and prn labetolol    4. CAD s/p stent placement: eliquis on hold, continue DAPT therapy, heparin drip. Full Code  Heparin gtt Dvt Prophylaxis  GI Prophylaxis not needed, tolerating diet well  Home medications reviewed and reconciled      Above treatment plan reviewed and discussed with patient in detail at bedside, all questions answered. Care Plan discussed with: Interdisciplinary     Total time spent with patient: 25 minutes.     Berkley Gu NP

## 2021-05-10 NOTE — PROGRESS NOTES
Reason for Admission: Heart Failure                      RUR Score: 8%                    Plan for utilizing home health: none - declines         PCP: First and Last name:  Princess Robles NP   Name of Practice:    Are you a current patient: Yes/No: yes   Approximate date of last visit: a few weeks ago   Can you participate in a virtual visit with your PCP: yes                    Current Advanced Directive/Advance Care Plan: Full Code      Healthcare Decision Maker:   Primary Healthcare Decision [facility] GLORIA (daughter) Pameal Max [phone]  Click here to complete [address] including selection of the Healthcare Decision Maker Relationship (ie \"Primary\")           Transition of Care Plan: CM met with the patient at the bedside to complete assessment. Patient reported he lives at home with his mother who he is assisting with caring for. He reported he is completely independent and cares for himself. He denies using any DME or assistive devices. He does not drive but does have transportation benefit with his medicaid and that is how he gets to and from his appointments. We discussed any discharge needs at this time and he declined. Patient intends to discharge home self-care.

## 2021-05-10 NOTE — PROGRESS NOTES
Physician Progress Note      PATIENT:               Silvio Rodriguez  CSN #:                  [id number]  :                       1960  ADMIT DATE:       2021 6:35 AM  DISCH DATE:  RESPONDING  PROVIDER #:        Bria Foley NP [address] NP          QUERY TEXT:    Pt admitted with shortness of breath and has CHF documented. If possible, please document in progress notes and discharge summary further specificity regarding the type and acuity of CHF:      The medical record reflects the following:  Risk Factors: 56year old male, shortness of breath, HTN, CAD s/p stent  Clinical Indicators:  H&P - AECHF unknwon EF   Echo - LVEF is 47%. Normal cavity size. Mild concentric hypertrophy. Globally reduced systolic function. BNP: 2,197   CXR - Minor coarse reticular markings, basilar lung predominance. No gross  interstitial or alveolar pulmonary edema. Cardiac and mediastinal structures  magnified on this AP portable upright view of the chest. No pneumothorax or  sizable pleural effusion. Treatment: IV Lasix 40mg    Please call [phone] with any questions  Options provided:  -- Acute on Chronic Systolic CHF/HFrEF  -- Acute Systolic CHF/HFrEF  -- Chronic Systolic CHF/HFrEF  -- Other - I will add my own diagnosis  -- Disagree - Not applicable / Not valid  -- Disagree - Clinically unable to determine / Unknown  -- Refer to Clinical Documentation Reviewer    PROVIDER RESPONSE TEXT:    This patient is in acute systolic CHF/HFrEF. Query created by:  Herber Richardson on 5/10/2021 12:07 PM      Electronically signed by:  Bria Foley NP [address] NP 5/10/2021 4:04 PM

## 2021-05-11 VITALS
HEIGHT: 71 IN | OXYGEN SATURATION: 100 % | DIASTOLIC BLOOD PRESSURE: 87 MMHG | WEIGHT: 211.86 LBS | HEART RATE: 77 BPM | RESPIRATION RATE: 20 BRPM | SYSTOLIC BLOOD PRESSURE: 126 MMHG | BODY MASS INDEX: 29.66 KG/M2 | TEMPERATURE: 97.4 F

## 2021-05-11 LAB
ACT BLD: 156 SEC (ref 74–125)
PERFORMED BY, TECHID: ABNORMAL

## 2021-05-11 PROCEDURE — 74011250637 HC RX REV CODE- 250/637: Performed by: INTERNAL MEDICINE

## 2021-05-11 PROCEDURE — 74011250637 HC RX REV CODE- 250/637: Performed by: HOSPITALIST

## 2021-05-11 PROCEDURE — 74011250636 HC RX REV CODE- 250/636: Performed by: HOSPITALIST

## 2021-05-11 RX ORDER — SODIUM CHLORIDE 0.9 % (FLUSH) 0.9 %
5-40 SYRINGE (ML) INJECTION AS NEEDED
Status: DISCONTINUED | OUTPATIENT
Start: 2021-05-11 | End: 2021-05-11 | Stop reason: HOSPADM

## 2021-05-11 RX ORDER — DIPHENHYDRAMINE HYDROCHLORIDE 50 MG/ML
50 INJECTION, SOLUTION INTRAMUSCULAR; INTRAVENOUS
Status: DISCONTINUED | OUTPATIENT
Start: 2021-05-11 | End: 2021-05-11 | Stop reason: HOSPADM

## 2021-05-11 RX ORDER — FUROSEMIDE 40 MG/1
20 TABLET ORAL DAILY
Qty: 15 TAB | Refills: 0 | Status: SHIPPED | OUTPATIENT
Start: 2021-05-11 | End: 2021-06-10

## 2021-05-11 RX ORDER — GUAIFENESIN 100 MG/5ML
81 LIQUID (ML) ORAL DAILY
Qty: 30 TAB | Refills: 0 | Status: SHIPPED
Start: 2021-05-11 | End: 2021-06-10

## 2021-05-11 RX ORDER — FUROSEMIDE 40 MG/1
20 TABLET ORAL DAILY
Status: DISCONTINUED | OUTPATIENT
Start: 2021-05-12 | End: 2021-05-11 | Stop reason: HOSPADM

## 2021-05-11 RX ORDER — LISINOPRIL 10 MG/1
10 TABLET ORAL DAILY
Qty: 30 TAB | Refills: 0 | Status: SHIPPED | OUTPATIENT
Start: 2021-05-12 | End: 2021-06-11

## 2021-05-11 RX ORDER — SODIUM CHLORIDE 0.9 % (FLUSH) 0.9 %
5-40 SYRINGE (ML) INJECTION EVERY 8 HOURS
Status: DISCONTINUED | OUTPATIENT
Start: 2021-05-11 | End: 2021-05-11 | Stop reason: HOSPADM

## 2021-05-11 RX ADMIN — APIXABAN 5 MG: 5 TABLET, FILM COATED ORAL at 08:35

## 2021-05-11 RX ADMIN — ASPIRIN 81 MG: 81 TABLET, CHEWABLE ORAL at 09:00

## 2021-05-11 RX ADMIN — FUROSEMIDE 40 MG: 10 INJECTION, SOLUTION INTRAMUSCULAR; INTRAVENOUS at 08:35

## 2021-05-11 RX ADMIN — CARVEDILOL 12.5 MG: 12.5 TABLET, FILM COATED ORAL at 08:35

## 2021-05-11 RX ADMIN — Medication 10 ML: at 08:38

## 2021-05-11 RX ADMIN — LISINOPRIL 10 MG: 10 TABLET ORAL at 08:37

## 2021-05-11 NOTE — PROGRESS NOTES
Sheath removed by night shift Kermit Essex rn. Patient resting in bed, denies any pain.      Bedside report received from [facility]

## 2021-05-11 NOTE — PROGRESS NOTES
I have reviewed discharge instructions with the patient. The patient verbalized understanding. Patient leaving cab to private residence. No changes in VS or assessment upon discharge

## 2021-05-11 NOTE — DISCHARGE INSTRUCTIONS
Patient Education        ACE Inhibitors: Care Instructions  Your Care Instructions     ACE (angiotensin-converting enzyme) inhibitors lower blood pressure. They also treat heart failure and prevent heart attacks and strokes. They block an enzyme that makes blood vessels narrow. As a result, the blood vessels relax and widen. This lowers blood pressure. These medicines also put more water and salt into the urine. This lowers blood pressure too. These medicines are a good choice for people with diabetes. They don't affect blood sugar levels, and they may protect the kidneys. Examples include:  · Benazepril (Lotensin). · Lisinopril (Prinivil, Zestril). · Ramipril (Altace). Before you start taking this medicine, make sure your doctor knows if you take other medicines, especially water pills (diuretics) or potassium tablets. And tell your doctor if you use a salt substitute. You should not take an ACE inhibitor if you are pregnant or planning to become pregnant. You may need regular blood tests. Follow-up care is a key part of your treatment and safety. Be sure to make and go to all appointments, and call your doctor if you are having problems. It's also a good idea to know your test results and keep a list of the medicines you take. How can you care for yourself at home? · Take your medicines exactly as prescribed. Be sure to take your medicine every day. Call your doctor if you think you are having a problem with your medicine. · ACE inhibitors can cause a dry cough. If the cough is bad, talk to your doctor. You may need to try a different medicine. · ACE inhibitors can cause swelling of your lips, tongue, or face. If the swelling is severe, you may need treatment right away. Severe swelling can make it hard to breathe, but this is very rare. · Check with your doctor or pharmacist before you use any other medicines. This includes over-the-counter medicines.  Make sure your doctor knows all of the medicines, vitamins, herbal products, and supplements you take. Taking some medicines together can cause problems. When should you call for help? Call your doctor now or seek immediate medical care if:    · You have swelling of your lips, tongue, or face.     · You think you are having problems with your medicine. Watch closely for changes in your health, and be sure to contact your doctor if you have any problems. Where can you learn more? Go to http://www.gray.com/  Enter R104108 in the search box to learn more about \"ACE Inhibitors: Care Instructions. \"  Current as of: August 31, 2020               Content Version: 12.8  © 2006-2021 [employer]. Care instructions adapted under license by [employer] (which disclaims liability or warranty for this information). If you have questions about a medical condition or this instruction, always ask your healthcare professional. [address] any warranty or liability for your use of this information.

## 2021-05-11 NOTE — DISCHARGE SUMMARY
Hospitalist Discharge Summary     Patient ID:    Mariano Herbert  [id number]  1 y.o.  1960    Admit date: 5/7/2021    Discharge date : 5/11/2021    Chronic Diagnoses:    Problem List as of 5/11/2021 Date Reviewed: 4/21/2021          Codes Class Noted - Resolved    * (Principal) Heart failure ([facility] [address] ICD-10-CM: I50.9  ICD-9-CM: 428.9  5/7/2021 - Present        HTN (hypertension), malignant ICD-10-CM: I10  ICD-9-CM: 401.0  5/7/2021 - Present          22    Final Diagnoses:   Principal Problem:    Heart failure ([facility] [address] (5/7/2021)    Active Problems:    HTN (hypertension), malignant (5/7/2021)        Reason for Hospitalization:  Pt admitted for acute chest pain , and associated dyspnea. Presented with mild elevation in troponin and hypertensive urgency. Pt has history of CAD and MI w/coronary stents in 2018, atrial fibrillation with controlled rate, on eliquis. Echocardiogram showing EF 47%, with reduced systolic function, dilated right and left atrium. Catheterization did not show arsalan obstructive coronary artery disease and stent is patent in RCA. Pt will continue with current medical management, and add daily low dose lasix, and lisinopril to control BP. Pt to follow up with cardiologist at [facility] on 5/24/21 as a new patient. Pt is stable for discharge home today, and agrees with current plan. Discharge Medications:   Current Discharge Medication List      START taking these medications    Details   lisinopriL (PRINIVIL, ZESTRIL) 10 mg tablet Take 1 Tab by mouth daily for 30 days. Qty: 30 Tab, Refills: 0  Start date: 5/12/2021, End date: 6/11/2021      furosemide (LASIX) 40 mg tablet Take 0.5 Tabs by mouth daily for 30 days. Qty: 15 Tab, Refills: 0  Start date: 5/11/2021, End date: 6/10/2021         CONTINUE these medications which have CHANGED    Details   aspirin 81 mg chewable tablet Take 1 Tab by mouth daily for 30 days.   Qty: 30 Tab, Refills: 0  Start date: 5/11/2021, End date: 6/10/2021         CONTINUE these medications which have NOT CHANGED    Details   apixaban (ELIQUIS) 5 mg tablet Take 1 Tab by mouth two (2) times a day. Qty: 180 Tab, Refills: 1      carvediloL (Coreg) 12.5 mg tablet Take 1 Tab by mouth two (2) times daily (with meals). Qty: 180 Tab, Refills: 1               Follow up Care:    1. Masoud Arora NP in 4 weeks    Follow-up Information     Follow up With Specialties Details Why Contact Info    Masoud Arora NP Nurse Practitioner In 4 weeks  [place] [address]      Delpha Boas, MD Cardiology, [address] [facility] Vascular Surgery, Internal Medicine On 5/24/2021 new patient appointment, already scheduled [address] [facility]  [phone]              Patient Follow Up Instructions: Activity: Activity as tolerated  Diet:  Cardiac Diet    Condition at Discharge:  Stable  __________________________________________________________________    Disposition  Home or Self Care  ____________________________________________________________________    Code Status:  Full Code  ___________________________________________________________________    Discharge Exam:  Patient seen and examined by me on discharge day. Physical Exam   Constitutional: He is oriented to person, place, and time. He appears well-developed. No distress. Neck: Normal range of motion. Neck supple. Cardiovascular: Normal rate and normal heart sounds. An irregular rhythm present. Pulmonary/Chest: Effort normal and breath sounds normal.   Abdominal: Soft. Bowel sounds are normal.   Musculoskeletal: Normal range of motion. Neurological: He is alert and oriented to person, place, and time. Skin: Skin is warm and dry. Psychiatric: He has a normal mood and affect.  His behavior is normal.        CONSULTATIONS: Cardiology    Significant Diagnostic Studies:   Recent Results (from the past 24 hour(s))   POC ACTIVATED CLOTTING TIME Collection Time: 05/10/21  1:37 PM   Result Value Ref Range    Activated Clotting Time (POC) 156 (H) 74 - 125 sec    Performed by Jannette Mckay    PTT    Collection Time: 05/10/21  5:06 PM   Result Value Ref Range    aPTT 35.1 (H) 21.2 - 34.1 sec    aPTT, therapeutic range   82 - 109 sec     XR CHEST PORT   Final Result          Discharge: time spent 35 minutes in discharge  Education and counseling.      Signed:  Shanell Osorio NP  5/11/2021  11:18 AM

## 2021-05-11 NOTE — PROGRESS NOTES
Progress Note      5/11/2021 1:54 PM  NAME: Lev Alford   MRN:  [id number]   Admit Diagnosis: Heart failure ([facility] [address] [I50.9]  HTN (hypertension), malignant [I10]      Problem List:   Chronic atrial fibrillation   Acute CHF decompensation  Acute non-Q wave MI  Cardiomyopathy  Tobacco addiction  CAD s/p MI w coronary stents 2018     Assessment/Plan:   Cardiac catheterization yesterday with non-critical CAD  Beta-blockers for heart rate control; ASA, Ace-I and statin Rx  Stable for discharge home         []       High complexity decision making was performed in this patient at high risk for decompensation with multiple organ involvement. Subjective:     Lev Alford denies chest pain, dyspnea. Discussed with RN events overnight. Review of Systems:   Negative except for as noted above. Objective:      Physical Exam:    Last 24hrs VS reviewed since prior progress note. Most recent are:    Visit Vitals  BP (!) 130/95 (BP Patient Position: Semi fowlers; At rest)   Pulse 89   Temp 97.5 °F (36.4 °C)   Resp 20   Ht 5' 10.87\" (1.8 m)   Wt 96.1 kg (211 lb 13.8 oz)   SpO2 97%   BMI 29.66 kg/m²       Intake/Output Summary (Last 24 hours) at 5/11/2021 1111  Last data filed at 5/10/2021 1212  Gross per 24 hour   Intake --   Output 600 ml   Net -600 ml        General Appearance: Well developed, well nourished, alert; no acute distress. Ears/Nose/Mouth/Throat: Hearing grossly normal; moist mucous membranes  Neck: Supple. Chest: Lungs clear to auscultation bilaterally. Cardiovascular: Regular rate and rhythm, S1S2 normal, no murmur. Abdomen: Soft, non-tender, bowel sounds are active. Extremities: No edema bilaterally. Skin: Warm and dry. []         Post-cath site without hematoma, bruit, tenderness, or thrill. Distal pulses intact. PMH/SH reviewed - no change compared to H&P    Telemetry: Atrial fibrillation with heart rate in the 70s    Echo 5/8/2021:  · LV: Calculated LVEF is 47%.  Normal cavity size. Mild concentric hypertrophy. Globally reduced systolic function. · LA: Moderately dilated left atrium. · RA: Mildly dilated right atrium. · MV: Mitral valve non-specific thickening. Mild mitral valve regurgitation is present. · TV: Mild to moderate tricuspid valve regurgitation is present       []  No new EKG for review    Lab Data Personally Reviewed:    No results for input(s): WBC, HGB, HCT, PLT, HGBEXT, HCTEXT, PLTEXT, HGBEXT, HCTEXT, PLTEXT in the last 72 hours. Recent Labs     05/10/21  1706 05/10/21  0737 05/09/21  1916   APTT 35.1* 98.3* 83.0*      No results for input(s): NA, K, CL, CO2, BUN, CREA, GLU, CA, MG in the last 72 hours. No results for input(s): CPK, CKNDX, TROIQ in the last 72 hours. No lab exists for component: CPKMB  Lab Results   Component Value Date/Time    Cholesterol, total 139 05/08/2021 05:22 AM    HDL Cholesterol 50 05/08/2021 05:22 AM    LDL, calculated 69.4 05/08/2021 05:22 AM    Triglyceride 98 05/08/2021 05:22 AM    CHOL/HDL Ratio 2.8 05/08/2021 05:22 AM       No results for input(s): AP, TBIL, TP, ALB, GLOB, GGT, AML, LPSE in the last 72 hours. No lab exists for component: SGOT, GPT, AMYP, HLPSE  No results for input(s): PH, PCO2, PO2 in the last 72 hours.     Medications Personally Reviewed:    Current Facility-Administered Medications   Medication Dose Route Frequency    sodium chloride (NS) flush 5-40 mL  5-40 mL IntraVENous Q8H    sodium chloride (NS) flush 5-40 mL  5-40 mL IntraVENous PRN    diphenhydrAMINE (BENADRYL) injection 50 mg  50 mg IntraVENous Q4H PRN    apixaban (ELIQUIS) tablet 5 mg  5 mg Oral BID    aspirin chewable tablet 81 mg  81 mg Oral DAILY    carvediloL (COREG) tablet 12.5 mg  12.5 mg Oral BID WITH MEALS    furosemide (LASIX) injection 40 mg  40 mg IntraVENous DAILY    labetaloL (NORMODYNE;TRANDATE) 20 mg/4 mL (5 mg/mL) injection 10 mg  10 mg IntraVENous Q4H PRN    lisinopriL (PRINIVIL, ZESTRIL) tablet 10 mg  10 mg Oral DAILY  heparin (porcine) 1,000 unit/mL injection 4,000 Units  4,000 Units IntraVENous PRN    Or    heparin (porcine) 1,000 unit/mL injection 2,000 Units  2,000 Units IntraVENous PRN         Gabriella Larson MD

## 2021-12-30 ENCOUNTER — HOSPITAL ENCOUNTER (EMERGENCY)
Age: 61
Discharge: HOME | End: 2021-12-30
Payer: MEDICAID

## 2021-12-30 ENCOUNTER — TRANSCRIBE ORDER (OUTPATIENT)
Dept: REGISTRATION | Age: 61
End: 2021-12-30

## 2021-12-30 ENCOUNTER — HOSPITAL ENCOUNTER (OUTPATIENT)
Dept: GENERAL RADIOLOGY | Age: 61
Discharge: HOME | End: 2021-12-30
Payer: COMMERCIAL

## 2021-12-30 VITALS
BODY MASS INDEX: 29.12 KG/M2 | SYSTOLIC BLOOD PRESSURE: 154 MMHG | WEIGHT: 208 LBS | DIASTOLIC BLOOD PRESSURE: 112 MMHG | RESPIRATION RATE: 16 BRPM | OXYGEN SATURATION: 98 % | TEMPERATURE: 98.1 F | HEIGHT: 71 IN | HEART RATE: 89 BPM

## 2021-12-30 DIAGNOSIS — Z02.71 ISSUE OF INCAPACITY CERTIFICATE: ICD-10-CM

## 2021-12-30 DIAGNOSIS — Z02.71 ISSUE OF INCAPACITY CERTIFICATE: Primary | ICD-10-CM

## 2021-12-30 DIAGNOSIS — Z76.0 MEDICATION REFILL: Primary | ICD-10-CM

## 2021-12-30 PROCEDURE — 72100 X-RAY EXAM L-S SPINE 2/3 VWS: CPT

## 2021-12-30 PROCEDURE — 99281 EMR DPT VST MAYX REQ PHY/QHP: CPT

## 2021-12-30 RX ORDER — FUROSEMIDE 40 MG/1
40 TABLET ORAL DAILY
COMMUNITY
End: 2021-12-30 | Stop reason: SDUPTHER

## 2021-12-30 RX ORDER — LISINOPRIL 40 MG/1
40 TABLET ORAL DAILY
COMMUNITY
End: 2021-12-30 | Stop reason: SDUPTHER

## 2021-12-30 RX ORDER — GUAIFENESIN 100 MG/5ML
81 LIQUID (ML) ORAL DAILY
COMMUNITY
End: 2021-12-30 | Stop reason: SDUPTHER

## 2021-12-30 RX ORDER — AMLODIPINE BESYLATE 5 MG/1
5 TABLET ORAL DAILY
COMMUNITY
End: 2021-12-30 | Stop reason: SDUPTHER

## 2021-12-30 RX ORDER — FUROSEMIDE 40 MG/1
40 TABLET ORAL DAILY
Qty: 30 TABLET | Refills: 0 | Status: SHIPPED | OUTPATIENT
Start: 2021-12-30

## 2021-12-30 RX ORDER — ATORVASTATIN CALCIUM 80 MG/1
80 TABLET, FILM COATED ORAL DAILY
Qty: 30 TABLET | Refills: 0 | Status: SHIPPED | OUTPATIENT
Start: 2021-12-30

## 2021-12-30 RX ORDER — GUAIFENESIN 100 MG/5ML
81 LIQUID (ML) ORAL DAILY
Qty: 30 TABLET | Refills: 0 | Status: SHIPPED | OUTPATIENT
Start: 2021-12-30

## 2021-12-30 RX ORDER — ATORVASTATIN CALCIUM 80 MG/1
80 TABLET, FILM COATED ORAL DAILY
COMMUNITY
End: 2021-12-30 | Stop reason: SDUPTHER

## 2021-12-30 RX ORDER — CARVEDILOL 25 MG/1
25 TABLET ORAL 2 TIMES DAILY WITH MEALS
COMMUNITY
End: 2021-12-30 | Stop reason: SDUPTHER

## 2021-12-30 RX ORDER — GLECAPREVIR AND PIBRENTASVIR 40; 100 MG/1; MG/1
1 TABLET, FILM COATED ORAL DAILY
Qty: 30 TABLET | Refills: 0 | Status: SHIPPED | OUTPATIENT
Start: 2021-12-30

## 2021-12-30 RX ORDER — LISINOPRIL 40 MG/1
40 TABLET ORAL DAILY
Qty: 30 TABLET | Refills: 0 | Status: SHIPPED | OUTPATIENT
Start: 2021-12-30

## 2021-12-30 RX ORDER — AMLODIPINE BESYLATE 5 MG/1
5 TABLET ORAL DAILY
Qty: 30 TABLET | Refills: 0 | Status: SHIPPED | OUTPATIENT
Start: 2021-12-30

## 2021-12-30 RX ORDER — GLECAPREVIR AND PIBRENTASVIR 40; 100 MG/1; MG/1
1 TABLET, FILM COATED ORAL DAILY
COMMUNITY
End: 2021-12-30 | Stop reason: SDUPTHER

## 2021-12-30 RX ORDER — CARVEDILOL 25 MG/1
25 TABLET ORAL 2 TIMES DAILY WITH MEALS
Qty: 60 TABLET | Refills: 0 | Status: SHIPPED | OUTPATIENT
Start: 2021-12-30

## 2021-12-30 NOTE — ED TRIAGE NOTES
States he is out of his medications and his [place]andy [place]. Needs to get a local doctor to follow up with.  Denies any symptoms at this time

## 2022-03-19 PROBLEM — I50.9 HEART FAILURE (HCC): Status: ACTIVE | Noted: 2021-05-07

## 2022-03-19 PROBLEM — I10 HTN (HYPERTENSION), MALIGNANT: Status: ACTIVE | Noted: 2021-05-07

## 2023-05-15 RX ORDER — LISINOPRIL 40 MG/1
40 TABLET ORAL DAILY
COMMUNITY
Start: 2021-12-30

## 2023-05-15 RX ORDER — AMLODIPINE BESYLATE 5 MG/1
5 TABLET ORAL DAILY
COMMUNITY
Start: 2021-12-30

## 2023-05-15 RX ORDER — ASPIRIN 81 MG/1
81 TABLET, CHEWABLE ORAL DAILY
COMMUNITY
Start: 2021-12-30

## 2023-05-15 RX ORDER — GLECAPREVIR AND PIBRENTASVIR 40; 100 MG/1; MG/1
1 TABLET, FILM COATED ORAL DAILY
COMMUNITY
Start: 2021-12-30

## 2023-05-15 RX ORDER — ATORVASTATIN CALCIUM 80 MG/1
80 TABLET, FILM COATED ORAL DAILY
COMMUNITY
Start: 2021-12-30

## 2023-05-15 RX ORDER — CARVEDILOL 25 MG/1
25 TABLET ORAL 2 TIMES DAILY WITH MEALS
COMMUNITY
Start: 2021-12-30

## 2023-05-15 RX ORDER — FUROSEMIDE 40 MG/1
40 TABLET ORAL DAILY
COMMUNITY
Start: 2021-12-30

## (undated) DEVICE — BAND RADIAL COMPR ARTERY 24CM -- REG BX/10

## (undated) DEVICE — GLIDESHEATH SLENDER ACCESS KIT: Brand: GLIDESHEATH SLENDER

## (undated) DEVICE — SYRINGE MED 10ML RED POLYCARB BRL FIX M LUER CONN FLAT GRP

## (undated) DEVICE — SYRINGE MED 10ML PUR GAM COMPATIBLE POLYCARB FIX M LUER CONN

## (undated) DEVICE — SURGICAL PROCEDURE TRAY CRD CATH 3 PRT

## (undated) DEVICE — TUBING PRESS INJ FLX SH 30IN --

## (undated) DEVICE — RADIFOCUS OPTITORQUE ANGIOGRAPHIC CATHETER: Brand: OPTITORQUE

## (undated) DEVICE — Device

## (undated) DEVICE — GUIDEWIRE VASC L260CM DIA0.035IN TIP L3MM STD EXCHG PTFE J

## (undated) DEVICE — 3M™ STERI-DRAPE™ SMALL DRAPE WITH ADHESIVE APERTURE 1092 25/BX,4/CS&#X20;: Brand: STERI-DRAPE™